# Patient Record
Sex: FEMALE | Race: WHITE | URBAN - METROPOLITAN AREA
[De-identification: names, ages, dates, MRNs, and addresses within clinical notes are randomized per-mention and may not be internally consistent; named-entity substitution may affect disease eponyms.]

---

## 2017-02-15 ENCOUNTER — OFFICE VISIT (OUTPATIENT)
Dept: FAMILY MEDICINE | Facility: CLINIC | Age: 32
End: 2017-02-15
Payer: COMMERCIAL

## 2017-02-15 VITALS
HEIGHT: 64 IN | SYSTOLIC BLOOD PRESSURE: 102 MMHG | DIASTOLIC BLOOD PRESSURE: 65 MMHG | OXYGEN SATURATION: 97 % | HEART RATE: 70 BPM | BODY MASS INDEX: 34.49 KG/M2 | TEMPERATURE: 97.7 F | WEIGHT: 202 LBS

## 2017-02-15 DIAGNOSIS — J00 ACUTE NASOPHARYNGITIS: Primary | ICD-10-CM

## 2017-02-15 PROCEDURE — 99203 OFFICE O/P NEW LOW 30 MIN: CPT | Performed by: PHYSICIAN ASSISTANT

## 2017-02-15 RX ORDER — FLUTICASONE PROPIONATE 50 MCG
1-2 SPRAY, SUSPENSION (ML) NASAL DAILY
Qty: 1 BOTTLE | Refills: 1 | Status: SHIPPED | OUTPATIENT
Start: 2017-02-15 | End: 2017-11-09

## 2017-02-15 RX ORDER — GUAIFENESIN 600 MG/1
600 TABLET, EXTENDED RELEASE ORAL 2 TIMES DAILY PRN
Qty: 20 TABLET | Refills: 0 | Status: SHIPPED | OUTPATIENT
Start: 2017-02-15 | End: 2017-08-07

## 2017-02-15 ASSESSMENT — PAIN SCALES - GENERAL: PAINLEVEL: EXTREME PAIN (9)

## 2017-02-15 NOTE — PROGRESS NOTES
"  SUBJECTIVE:                                                    Lee Thakkar is a 31 year old female who presents to clinic today for the following health issues:    ENT Symptoms             Symptoms: cc Present Absent Comment   Fever/Chills  x  Not measured   Fatigue   x    Muscle Aches   x    Eye Irritation  x     Sneezing  x     Nasal Sage/Drg  x     Sinus Pressure/Pain  x     Loss of smell  x     Dental pain   x    Sore Throat  x     Swollen Glands   x    Ear Pain/Fullness  x  Popping and painful can't sleep    Cough  x     Wheeze   x    Chest Pain   x    Shortness of breath   x    Rash   x    Other         Symptom duration:  2 day   Symptom severity:  moderate    Treatments tried:  Nothing   Contacts:  NO     Patient had a similar episode 6mo ago which was diagnosed as Strep. Throat. She was treated with penicillin. Left ear pain is most bothersome symptoms. Hearing is intact. Patient is new to [facility] [employer]. She smokes 1 pack of cigarettes every two weeks. She reports seasonal allergies and denies asthma.      Problem list and histories reviewed & adjusted, as indicated.  Additional history: as documented    Problem list, Medication list, Allergies, and Medical/Social/Surgical histories reviewed in [facility] and updated as appropriate.    ROS:  Constitutional, HEENT, cardiovascular, pulmonary, gi and gu systems are negative, except as otherwise noted.    OBJECTIVE:                                                    /65 (BP Location: Right arm, Patient Position: Chair, Cuff Size: Adult Large)  Pulse 70  Temp 97.7  F (36.5  C) (Oral)  Ht 5' 4\" (1.626 m)  Wt 202 lb (91.6 kg)  LMP 02/08/2017  SpO2 97%  BMI 34.67 kg/m2  Body mass index is 34.67 kg/(m^2).  GENERAL: healthy, alert and no distress  EYES: Eyes grossly normal to inspection, PERRL and conjunctivae and sclerae normal  HENT: ear canals and TM's normal, nose and mouth without ulcers or lesions. Possible serous fluid in left ear. Hearing intact " bilaterally.   NECK: no adenopathy, no asymmetry, masses, or scars and thyroid normal to palpation  RESP: lungs clear to auscultation - no rales, rhonchi or wheezes  CV: regular rate and rhythm, normal S1 S2, no S3 or S4, no murmur, click or rub, no peripheral edema and peripheral pulses strongd    Diagnostic Test Results: None     ASSESSMENT/PLAN:                                                          ICD-10-CM    1. Acute nasopharyngitis J00 fluticasone (FLONASE) 50 MCG/ACT spray     guaiFENesin (MUCINEX) 600 MG 12 hr tablet     Plan:   Discussed viral illness and no need for antibiotics.   Use Flonase nasal spray and mucinex qday for nose/sinus/ear symptoms  Follow-up if fever develops or symptoms worsen.    RENETTA Bah PA-C  [facility]  The student Calros Malik PAS2 acted as a scribe and the encounter documented above was completely performed by myself and the documentation reflects the work I have performed today.   Linda Eliazbeth PA-C

## 2017-02-15 NOTE — MR AVS SNAPSHOT
"              After Visit Summary   2/15/2017    Lee Sheltonr    MRN: [id number]           Patient Information     Date Of Birth          1985        Visit Information        Provider Department      2/15/2017 12:40 PM Linda Elizabeth PA-C [facility]        Today's Diagnoses     Acute nasopharyngitis    -  1       Follow-ups after your visit        Who to contact     If you have questions or need follow up information about today's clinic visit or your schedule please contact [facility] directly at [phone].  Normal or non-critical lab and imaging results will be communicated to you by Booshakahart, letter or phone within 4 business days after the clinic has received the results. If you do not hear from us within 7 days, please contact the clinic through Booshakahart or phone. If you have a critical or abnormal lab result, we will notify you by phone as soon as possible.  Submit refill requests through Medminder or call your pharmacy and they will forward the refill request to us. Please allow 3 business days for your refill to be completed.          Additional Information About Your Visit        MyChart Information     Medminder lets you send messages to your doctor, view your test results, renew your prescriptions, schedule appointments and more. To sign up, go to [email or web address] . Click on \"Log in\" on the left side of the screen, which will take you to the Welcome page. Then click on \"Sign up Now\" on the right side of the page.     You will be asked to enter the access code listed below, as well as some personal information. Please follow the directions to create your username and password.     Your access code is: [email or web address]  Expires: 2017  1:16 PM     Your access code will  in 90 days. If you need help or a new code, please call your [facility] or [phone].        Care EveryWhere ID     This is your Care EveryWhere ID. This could be used by " "other organizations to access your [facility] medical records  [id number]        Your Vitals Were     Pulse Temperature Height Last Period Pulse Oximetry BMI (Body Mass Index)    70 97.7  F (36.5  C) (Oral) 5' 4\" (1.626 m) 02/08/2017 97% 34.67 kg/m2       Blood Pressure from Last 3 Encounters:   02/15/17 102/65    Weight from Last 3 Encounters:   02/15/17 202 lb (91.6 kg)              Today, you had the following     No orders found for display         Today's Medication Changes          These changes are accurate as of: 2/15/17  1:16 PM.  If you have any questions, ask your nurse or doctor.               Start taking these medicines.        Dose/Directions    fluticasone 50 MCG/ACT spray   Commonly known as:  FLONASE   Used for:  Acute nasopharyngitis   Started by:  Linda Elizabeth PA-C        Dose:  1-2 spray   Spray 1-2 sprays into both nostrils daily   Quantity:  1 Bottle   Refills:  1       guaiFENesin 600 MG 12 hr tablet   Commonly known as:  MUCINEX   Used for:  Acute nasopharyngitis   Started by:  Linda Elizabeth PA-C        Dose:  600 mg   Take 1 tablet (600 mg) by mouth 2 times daily as needed for congestion   Quantity:  20 tablet   Refills:  0            Where to get your medicines      These medications were sent to [facility] [address]  [address], [facility] [address]     Phone:  [phone]     fluticasone 50 MCG/ACT spray    guaiFENesin 600 MG 12 hr tablet                Primary Care Provider    None Specified       No primary provider on file.        Thank you!     Thank you for choosing [facility]  for your care. Our goal is always to provide you with excellent care. Hearing back from our patients is one way we can continue to improve our services. Please take a few minutes to complete the written survey that you may receive in the mail after your visit with us. Thank you!             Your Updated " Medication List - Protect others around you: Learn how to safely use, store and throw away your medicines at www.disposemymeds.org.          This list is accurate as of: 2/15/17  1:16 PM.  Always use your most recent med list.                   Brand Name Dispense Instructions for use    fluticasone 50 MCG/ACT spray    FLONASE    1 Bottle    Spray 1-2 sprays into both nostrils daily       guaiFENesin 600 MG 12 hr tablet    MUCINEX    20 tablet    Take 1 tablet (600 mg) by mouth 2 times daily as needed for congestion       VITAMIN D (CHOLECALCIFEROL) PO      Take by mouth daily       VITAMIN E NATURAL PO

## 2017-02-15 NOTE — LETTER
[address] [address]  Phone: [phone]    February 15, 2017        Lee Miami  [address] [facility] [address]          To whom it may concern:    RE: Lee Miami    Patient was seen and treated today at our clinic and missed work. Please excuse her from work due to illness from 2/15/17-2/16/17.    Please contact me for questions or concerns.      Sincerely,        Linda Elizabeth PA-C

## 2017-02-15 NOTE — NURSING NOTE
"Chief Complaint   Patient presents with     Ear Problem     possible ear infection     Establish Care       Initial /65 (BP Location: Right arm, Patient Position: Chair, Cuff Size: Adult Large)  Pulse 70  Temp 97.7  F (36.5  C) (Oral)  Ht 5' 4\" (1.626 m)  Wt 202 lb (91.6 kg)  LMP 02/08/2017  SpO2 97%  BMI 34.67 kg/m2 Estimated body mass index is 34.67 kg/(m^2) as calculated from the following:    Height as of this encounter: 5' 4\" (1.626 m).    Weight as of this encounter: 202 lb (91.6 kg).  Medication Reconciliation: complete   Jeri Radford CMA      "

## 2017-05-05 ENCOUNTER — OFFICE VISIT (OUTPATIENT)
Dept: INTERNAL MEDICINE | Facility: CLINIC | Age: 32
End: 2017-05-05
Payer: COMMERCIAL

## 2017-05-05 VITALS
WEIGHT: 204 LBS | SYSTOLIC BLOOD PRESSURE: 111 MMHG | OXYGEN SATURATION: 98 % | TEMPERATURE: 100.4 F | BODY MASS INDEX: 35.02 KG/M2 | HEART RATE: 98 BPM | DIASTOLIC BLOOD PRESSURE: 69 MMHG

## 2017-05-05 DIAGNOSIS — J01.90 ACUTE RHINOSINUSITIS: Primary | ICD-10-CM

## 2017-05-05 PROCEDURE — 99213 OFFICE O/P EST LOW 20 MIN: CPT | Performed by: NURSE PRACTITIONER

## 2017-05-05 ASSESSMENT — PAIN SCALES - GENERAL: PAINLEVEL: EXTREME PAIN (8)

## 2017-05-05 NOTE — NURSING NOTE
"Chief Complaint   Patient presents with     Sinus Problem       Initial /69 (BP Location: Right arm, Patient Position: Chair, Cuff Size: Adult Large)  Pulse 98  Temp 100.4  F (38  C) (Oral)  Wt 204 lb (92.5 kg)  LMP 04/23/2017  SpO2 98%  Breastfeeding? No  BMI 35.02 kg/m2 Estimated body mass index is 35.02 kg/(m^2) as calculated from the following:    Height as of 2/15/17: 5' 4\" (1.626 m).    Weight as of this encounter: 204 lb (92.5 kg).  Medication Reconciliation: complete.  RHODA Armendariz MA      "

## 2017-05-05 NOTE — MR AVS SNAPSHOT
"              After Visit Summary   5/5/2017    Lee Sheltonr    MRN: [id number]           Patient Information     Date Of Birth          1985        Visit Information        Provider Department      5/5/2017 8:20 AM Ernestina Barnett APRN CNP [facility]        Today's Diagnoses     Acute rhinosinusitis    -  1      Care Instructions    Continue using the Flonase nasal spray spray, 2 sprays in each nostril once daily        Follow-ups after your visit        Your next 10 appointments already scheduled     May 08, 2017  8:00 AM CDT   PHYSICAL with TATI Rothman CNP   [facility] ([facility])    [address] [facility] [address] [phone]              Who to contact     If you have questions or need follow up information about today's clinic visit or your schedule please contact [facility] directly at [phone].  Normal or non-critical lab and imaging results will be communicated to you by MyChart, letter or phone within 4 business days after the clinic has received the results. If you do not hear from us within 7 days, please contact the clinic through Netmininghart or phone. If you have a critical or abnormal lab result, we will notify you by phone as soon as possible.  Submit refill requests through Page Mage or call your pharmacy and they will forward the refill request to us. Please allow 3 business days for your refill to be completed.          Additional Information About Your Visit        Netmininghart Information     Page Mage lets you send messages to your doctor, view your test results, renew your prescriptions, schedule appointments and more. To sign up, go to [email or web address] . Click on \"Log in\" on the left side of the screen, which will take you to the Welcome page. Then click on \"Sign up Now\" on the right side of the page.     You will be asked to enter the " access code listed below, as well as some personal information. Please follow the directions to create your username and password.     Your access code is: [email or web address]  Expires: 2017  2:16 PM     Your access code will  in 90 days. If you need help or a new code, please call your [facility] clinic or [phone].        Care EveryWhere ID     This is your Care EveryWhere ID. This could be used by other organizations to access your [facility] medical records  [id number]        Your Vitals Were     Pulse Temperature Last Period Pulse Oximetry Breastfeeding? BMI (Body Mass Index)    98 100.4  F (38  C) (Oral) 2017 98% No 35.02 kg/m2       Blood Pressure from Last 3 Encounters:   17 111/69   02/15/17 102/65    Weight from Last 3 Encounters:   17 204 lb (92.5 kg)   02/15/17 202 lb (91.6 kg)              Today, you had the following     No orders found for display         Today's Medication Changes          These changes are accurate as of: 17  8:51 AM.  If you have any questions, ask your nurse or doctor.               Start taking these medicines.        Dose/Directions    amoxicillin-clavulanate 875-125 MG per tablet   Commonly known as:  AUGMENTIN   Used for:  Acute rhinosinusitis   Started by:  Ernestina Barnett APRN CNP        Dose:  1 tablet   Take 1 tablet by mouth 2 times daily   Quantity:  20 tablet   Refills:  0            Where to get your medicines      These medications were sent to [facility] [place], [place] - [address]  [address], [facility] [address]     Phone:  [phone]     amoxicillin-clavulanate 875-125 MG per tablet                Primary Care Provider    None Specified       No primary provider on file.        Thank you!     Thank you for choosing [facility]  for your care. Our goal is always to provide you with excellent care. Hearing back from our patients is one way we can  continue to improve our services. Please take a few minutes to complete the written survey that you may receive in the mail after your visit with us. Thank you!             Your Updated Medication List - Protect others around you: Learn how to safely use, store and throw away your medicines at www.disposemymeds.org.          This list is accurate as of: 5/5/17  8:51 AM.  Always use your most recent med list.                   Brand Name Dispense Instructions for use    amoxicillin-clavulanate 875-125 MG per tablet    AUGMENTIN    20 tablet    Take 1 tablet by mouth 2 times daily       fluticasone 50 MCG/ACT spray    FLONASE    1 Bottle    Spray 1-2 sprays into both nostrils daily       guaiFENesin 600 MG 12 hr tablet    MUCINEX    20 tablet    Take 1 tablet (600 mg) by mouth 2 times daily as needed for congestion       VITAMIN D (CHOLECALCIFEROL) PO      Take by mouth daily Reported on 5/5/2017       VITAMIN E NATURAL PO

## 2017-05-05 NOTE — PROGRESS NOTES
SUBJECTIVE:                                                    Lee Thakkar is a 32 year old female who presents to clinic today for the following health issues:      Acute Illness   Acute illness concerns: sinus problem  Onset: 3 days ago    Fever: YES- 100.4    Chills/Sweats: YES    Headache (location?): YES    Sinus Pressure:YES- tender, facial pain and mucopurulent discharge    Conjunctivitis:  Watery    Ear Pain: YES: right    Rhinorrhea: YES    Congestion: YES- nasal    Sore Throat: YES- yesterday but not today     Cough: YES-non-productive    Wheeze: no    Decreased Appetite: YES    Nausea: no    Vomiting: no    Diarrhea:  no    Dysuria/Freq.: no    Fatigue/Achiness: no    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  Additional history: none    There is no problem list on file for this patient.    History reviewed. No pertinent surgical history.    Social History   Substance Use Topics     Smoking status: Current Every Day Smoker     Packs/day: 0.50     Types: Cigarettes     Smokeless tobacco: Not on file     Alcohol use Yes     History reviewed. No pertinent family history.      Current Outpatient Prescriptions   Medication Sig Dispense Refill     amoxicillin-clavulanate (AUGMENTIN) 875-125 MG per tablet Take 1 tablet by mouth 2 times daily 20 tablet 0     VITAMIN D, CHOLECALCIFEROL, PO Take by mouth daily Reported on 5/5/2017       VITAMIN E NATURAL PO        fluticasone (FLONASE) 50 MCG/ACT spray Spray 1-2 sprays into both nostrils daily (Patient not taking: Reported on 5/5/2017) 1 Bottle 1     guaiFENesin (MUCINEX) 600 MG 12 hr tablet Take 1 tablet (600 mg) by mouth 2 times  daily as needed for congestion (Patient not taking: Reported on 5/5/2017) 20 tablet 0       Reviewed and updated as needed this visit by clinical staff  Tobacco  Allergies  Meds  Med Hx  Surg Hx  Fam Hx  Soc Hx      Reviewed and updated as needed this visit by Provider         ROS:  Constitutional, HEENT, cardiovascular, pulmonary, gi and gu systems are negative, except as otherwise noted.    OBJECTIVE:                                                    /69 (BP Location: Right arm, Patient Position: Chair, Cuff Size: Adult Large)  Pulse 98  Temp 100.4  F (38  C) (Oral)  Wt 204 lb (92.5 kg)  LMP 04/23/2017  SpO2 98%  Breastfeeding? No  BMI 35.02 kg/m2  Body mass index is 35.02 kg/(m^2).  GENERAL: alert, no distress and fatigued  EYES: Eyes grossly normal to inspection, PERRL and conjunctivae and sclerae normal  HENT: normal cephalic/atraumatic, ear canals and TM's normal, nose and mouth without ulcers or lesions, nasal mucosa edematous (L>R) , rhinorrhea yellow, oropharynx clear, oral mucous membranes moist, tonsillar hypertrophy and tonsillar erythema without exudate, tenderness to bilateral frontal sinuses  NECK: no adenopathy, no asymmetry, masses, or scars and thyroid normal to palpation  RESP: lungs clear to auscultation - no rales, rhonchi or wheezes  CV: regular rate and rhythm, normal S1 S2, no S3 or S4, no murmur, click or rub, no peripheral edema and peripheral pulses strong    Diagnostic Test Results:  none      ASSESSMENT/PLAN:                                                        ICD-10-CM    1. Acute rhinosinusitis J01.90 amoxicillin-clavulanate (AUGMENTIN) 875-125 MG per tablet       Symptoms only present 3 days, but she appears acutely ill, febrile, and left nasal mucosa quite edematous. Recommend treatment with antibiotic.  Continue with Flonase nasal spray  May continue with over the counter medications as needed for symptomatic relief  Do not take more than 3,000 mg  acetaminophen in 24 hour period      TATI Matos CNP  [facility]

## 2017-05-15 ENCOUNTER — OFFICE VISIT (OUTPATIENT)
Dept: INTERNAL MEDICINE | Facility: CLINIC | Age: 32
End: 2017-05-15
Payer: COMMERCIAL

## 2017-05-15 ENCOUNTER — TELEPHONE (OUTPATIENT)
Dept: INTERNAL MEDICINE | Facility: CLINIC | Age: 32
End: 2017-05-15

## 2017-05-15 VITALS
DIASTOLIC BLOOD PRESSURE: 58 MMHG | TEMPERATURE: 98.5 F | OXYGEN SATURATION: 98 % | BODY MASS INDEX: 35.02 KG/M2 | WEIGHT: 204 LBS | HEART RATE: 84 BPM | SYSTOLIC BLOOD PRESSURE: 99 MMHG

## 2017-05-15 DIAGNOSIS — B35.1 ONYCHOMYCOSIS: ICD-10-CM

## 2017-05-15 DIAGNOSIS — F32.0 MILD SINGLE CURRENT EPISODE OF MAJOR DEPRESSIVE DISORDER (H): ICD-10-CM

## 2017-05-15 DIAGNOSIS — Z00.01 ENCOUNTER FOR PREVENTATIVE ADULT HEALTH CARE EXAM WITH ABNORMAL FINDINGS: Primary | ICD-10-CM

## 2017-05-15 DIAGNOSIS — Z72.0 TOBACCO ABUSE: ICD-10-CM

## 2017-05-15 DIAGNOSIS — Z11.3 SCREEN FOR STD (SEXUALLY TRANSMITTED DISEASE): ICD-10-CM

## 2017-05-15 DIAGNOSIS — Z12.4 CERVICAL CANCER SCREENING: ICD-10-CM

## 2017-05-15 LAB
ANION GAP SERPL CALCULATED.3IONS-SCNC: 10 MMOL/L (ref 3–14)
BUN SERPL-MCNC: 9 MG/DL (ref 7–30)
CALCIUM SERPL-MCNC: 8.6 MG/DL (ref 8.5–10.1)
CHLORIDE SERPL-SCNC: 107 MMOL/L (ref 94–109)
CO2 SERPL-SCNC: 23 MMOL/L (ref 20–32)
CREAT SERPL-MCNC: 0.76 MG/DL (ref 0.52–1.04)
ERYTHROCYTE [DISTWIDTH] IN BLOOD BY AUTOMATED COUNT: 13.2 % (ref 10–15)
GFR SERPL CREATININE-BSD FRML MDRD: 87 ML/MIN/1.7M2
GLUCOSE SERPL-MCNC: 104 MG/DL (ref 70–99)
HBA1C MFR BLD: 5.4 % (ref 4.3–6)
HCT VFR BLD AUTO: 40.7 % (ref 35–47)
HGB BLD-MCNC: 13.5 G/DL (ref 11.7–15.7)
MCH RBC QN AUTO: 31.5 PG (ref 26.5–33)
MCHC RBC AUTO-ENTMCNC: 33.2 G/DL (ref 31.5–36.5)
MCV RBC AUTO: 95 FL (ref 78–100)
PLATELET # BLD AUTO: 280 10E9/L (ref 150–450)
POTASSIUM SERPL-SCNC: 3.9 MMOL/L (ref 3.4–5.3)
RBC # BLD AUTO: 4.29 10E12/L (ref 3.8–5.2)
SODIUM SERPL-SCNC: 140 MMOL/L (ref 133–144)
TSH SERPL DL<=0.005 MIU/L-ACNC: 0.64 MU/L (ref 0.4–4)
WBC # BLD AUTO: 9.4 10E9/L (ref 4–11)

## 2017-05-15 PROCEDURE — 85027 COMPLETE CBC AUTOMATED: CPT | Performed by: NURSE PRACTITIONER

## 2017-05-15 PROCEDURE — 87591 N.GONORRHOEAE DNA AMP PROB: CPT | Performed by: NURSE PRACTITIONER

## 2017-05-15 PROCEDURE — 82306 VITAMIN D 25 HYDROXY: CPT | Performed by: NURSE PRACTITIONER

## 2017-05-15 PROCEDURE — 87491 CHLMYD TRACH DNA AMP PROBE: CPT | Performed by: NURSE PRACTITIONER

## 2017-05-15 PROCEDURE — 36415 COLL VENOUS BLD VENIPUNCTURE: CPT | Performed by: NURSE PRACTITIONER

## 2017-05-15 PROCEDURE — 86780 TREPONEMA PALLIDUM: CPT | Performed by: NURSE PRACTITIONER

## 2017-05-15 PROCEDURE — 80048 BASIC METABOLIC PNL TOTAL CA: CPT | Performed by: NURSE PRACTITIONER

## 2017-05-15 PROCEDURE — 84443 ASSAY THYROID STIM HORMONE: CPT | Performed by: NURSE PRACTITIONER

## 2017-05-15 PROCEDURE — 99213 OFFICE O/P EST LOW 20 MIN: CPT | Mod: 25 | Performed by: NURSE PRACTITIONER

## 2017-05-15 PROCEDURE — 87389 HIV-1 AG W/HIV-1&-2 AB AG IA: CPT | Performed by: NURSE PRACTITIONER

## 2017-05-15 PROCEDURE — G0476 HPV COMBO ASSAY CA SCREEN: HCPCS | Performed by: NURSE PRACTITIONER

## 2017-05-15 PROCEDURE — G0145 SCR C/V CYTO,THINLAYER,RESCR: HCPCS | Performed by: NURSE PRACTITIONER

## 2017-05-15 PROCEDURE — 87624 HPV HI-RISK TYP POOLED RSLT: CPT | Performed by: NURSE PRACTITIONER

## 2017-05-15 PROCEDURE — 99395 PREV VISIT EST AGE 18-39: CPT | Performed by: NURSE PRACTITIONER

## 2017-05-15 PROCEDURE — 83036 HEMOGLOBIN GLYCOSYLATED A1C: CPT | Performed by: NURSE PRACTITIONER

## 2017-05-15 RX ORDER — ESCITALOPRAM OXALATE 20 MG/1
TABLET ORAL
Qty: 30 TABLET | Refills: 0 | Status: SHIPPED | OUTPATIENT
Start: 2017-05-15 | End: 2017-08-07

## 2017-05-15 RX ORDER — TERBINAFINE HYDROCHLORIDE 250 MG/1
250 TABLET ORAL DAILY
Qty: 90 TABLET | Refills: 0 | Status: SHIPPED | OUTPATIENT
Start: 2017-05-15 | End: 2017-10-27

## 2017-05-15 ASSESSMENT — ANXIETY QUESTIONNAIRES
7. FEELING AFRAID AS IF SOMETHING AWFUL MIGHT HAPPEN: NEARLY EVERY DAY
IF YOU CHECKED OFF ANY PROBLEMS ON THIS QUESTIONNAIRE, HOW DIFFICULT HAVE THESE PROBLEMS MADE IT FOR YOU TO DO YOUR WORK, TAKE CARE OF THINGS AT HOME, OR GET ALONG WITH OTHER PEOPLE: VERY DIFFICULT
1. FEELING NERVOUS, ANXIOUS, OR ON EDGE: NEARLY EVERY DAY
GAD7 TOTAL SCORE: 21
5. BEING SO RESTLESS THAT IT IS HARD TO SIT STILL: NEARLY EVERY DAY
2. NOT BEING ABLE TO STOP OR CONTROL WORRYING: NEARLY EVERY DAY
3. WORRYING TOO MUCH ABOUT DIFFERENT THINGS: NEARLY EVERY DAY
6. BECOMING EASILY ANNOYED OR IRRITABLE: NEARLY EVERY DAY

## 2017-05-15 ASSESSMENT — PAIN SCALES - GENERAL: PAINLEVEL: NO PAIN (0)

## 2017-05-15 ASSESSMENT — PATIENT HEALTH QUESTIONNAIRE - PHQ9: 5. POOR APPETITE OR OVEREATING: NEARLY EVERY DAY

## 2017-05-15 NOTE — PATIENT INSTRUCTIONS
Return to clinic in 2 weeks. We will start medication for smoking cessation and follow up on depression      Preventive Health Recommendations  Female Ages 26 - 39  Yearly exam:   See your health care provider every year in order to    Review health changes.     Discuss preventive care.      Review your medicines if you your doctor has prescribed any.    Until age 30: Get a Pap test every three years (more often if you have had an abnormal result).    After age 30: Talk to your doctor about whether you should have a Pap test every 3 years or have a Pap test with HPV screening every 5 years.   You do not need a Pap test if your uterus was removed (hysterectomy) and you have not had cancer.  You should be tested each year for STDs (sexually transmitted diseases), if you're at risk.   Talk to your provider about how often to have your cholesterol checked.  If you are at risk for diabetes, you should have a diabetes test (fasting glucose).  Shots: Get a flu shot each year. Get a tetanus shot every 10 years.   Nutrition:     Eat at least 5 servings of fruits and vegetables each day.    Eat whole-grain bread, whole-wheat pasta and brown rice instead of white grains and rice.    Talk to your provider about Calcium and Vitamin D.     Lifestyle    Exercise at least 150 minutes a week (30 minutes a day, 5 days of the week). This will help you control your weight and prevent disease.    Limit alcohol to one drink per day.    No smoking.     Wear sunscreen to prevent skin cancer.    See your dentist every six months for an exam and cleaning.

## 2017-05-15 NOTE — MR AVS SNAPSHOT
After Visit Summary   5/15/2017    Lee Sheltonr    MRN: [id number]           Patient Information     Date Of Birth          1985        Visit Information        Provider Department      5/15/2017 1:20 PM Ernestina Barnett APRN [facility]        Today's Diagnoses     Routine general medical examination at a health care facility    -  1    Screen for STD (sexually transmitted disease)        Cervical cancer screening        Mild single current episode of major depressive disorder (H)        Onychomycosis        Tobacco abuse          Care Instructions    Return to clinic in 2 weeks. We will start medication for smoking cessation and follow up on depression      Preventive Health Recommendations  Female Ages 26 - 39  Yearly exam:   See your health care provider every year in order to    Review health changes.     Discuss preventive care.      Review your medicines if you your doctor has prescribed any.    Until age 30: Get a Pap test every three years (more often if you have had an abnormal result).    After age 30: Talk to your doctor about whether you should have a Pap test every 3 years or have a Pap test with HPV screening every 5 years.   You do not need a Pap test if your uterus was removed (hysterectomy) and you have not had cancer.  You should be tested each year for STDs (sexually transmitted diseases), if you're at risk.   Talk to your provider about how often to have your cholesterol checked.  If you are at risk for diabetes, you should have a diabetes test (fasting glucose).  Shots: Get a flu shot each year. Get a tetanus shot every 10 years.   Nutrition:     Eat at least 5 servings of fruits and vegetables each day.    Eat whole-grain bread, whole-wheat pasta and brown rice instead of white grains and rice.    Talk to your provider about Calcium and Vitamin D.     Lifestyle    Exercise at least 150 minutes a week (30 minutes a day, 5 days of the week).  "This will help you control your weight and prevent disease.    Limit alcohol to one drink per day.    No smoking.     Wear sunscreen to prevent skin cancer.    See your dentist every six months for an exam and cleaning.          Follow-ups after your visit        Additional Services     MENTAL HEALTH REFERRAL       Your provider has referred you to: [facility] Providers Intake Scheduling [phone]  [email or web address]    All scheduling is subject to the client's specific insurance plan & benefits, provider/location availability, and provider clinical specialities.  Please arrive 15 minutes early for your first appointment and bring your completed paperwork.    Please be aware that coverage of these services is subject to the terms and limitations of your health insurance plan.  Call member services at your health plan with any benefit or coverage questions.                  Who to contact     If you have questions or need follow up information about today's clinic visit or your schedule please contact [facility] directly at [phone].  Normal or non-critical lab and imaging results will be communicated to you by Help [employer], letter or phone within 4 business days after the clinic has received the results. If you do not hear from us within 7 days, please contact the clinic through Help [employer] or phone. If you have a critical or abnormal lab result, we will notify you by phone as soon as possible.  Submit refill requests through [employer] or call your pharmacy and they will forward the refill request to us. Please allow 3 business days for your refill to be completed.          Additional Information About Your Visit        [employer] Information     [employer] lets you send messages to your doctor, view your test results, renew your prescriptions, schedule appointments and more. To sign up, go to [email or web address] . Click on \"Log in\" on the left side of the screen, which will take " "you to the Welcome page. Then click on \"Sign up Now\" on the right side of the page.     You will be asked to enter the access code listed below, as well as some personal information. Please follow the directions to create your username and password.     Your access code is: [email or web address]  Expires: 2017  2:16 PM     Your access code will  in 90 days. If you need help or a new code, please call your [facility] clinic or [phone].        Care EveryWhere ID     This is your Care EveryWhere ID. This could be used by other organizations to access your [facility] medical records  [id number]        Your Vitals Were     Pulse Temperature Last Period Pulse Oximetry Breastfeeding? BMI (Body Mass Index)    84 98.5  F (36.9  C) (Oral) 2017 98% No 35.02 kg/m2       Blood Pressure from Last 3 Encounters:   05/15/17 99/58   17 111/69   02/15/17 102/65    Weight from Last 3 Encounters:   05/15/17 204 lb (92.5 kg)   17 204 lb (92.5 kg)   02/15/17 202 lb (91.6 kg)              We Performed the Following     Anti Treponema     Basic metabolic panel     CBC with platelets     Chlamydia trachomatis PCR     Hemoglobin A1c     HIV Antigen Antibody Combo     HPV High Risk Types DNA Cervical     MENTAL HEALTH REFERRAL     Neisseria gonorrhoeae PCR     Pap imaged thin layer screen with HPV - recommended age 30 - 65 years (select HPV order below)     TSH with free T4 reflex     Vitamin D Deficiency          Today's Medication Changes          These changes are accurate as of: 5/15/17  2:04 PM.  If you have any questions, ask your nurse or doctor.               Start taking these medicines.        Dose/Directions    escitalopram 20 MG tablet   Commonly known as:  LEXAPRO   Used for:  Mild single current episode of major depressive disorder (H)   Started by:  Ernestina Barnett APRN CNP        Take 1/2 tablet (10 mg) for 1 week, then increase to 1 tablet orally daily   Quantity:  30 tablet   Refills:  0       " terbinafine 250 MG tablet   Commonly known as:  lamISIL   Used for:  Onychomycosis   Started by:  Ernestina Barnett APRN CNP        Dose:  250 mg   Take 1 tablet (250 mg) by mouth daily   Quantity:  90 tablet   Refills:  0            Where to get your medicines      These medications were sent to [facility] - [place], [place] - [address]  [address], [facility] [address]     Phone:  [phone]     escitalopram 20 MG tablet    terbinafine 250 MG tablet                Primary Care Provider Office Phone # Fax #    TATI Rothman [phone]       [facility] [address]  [facility] [address]        Thank you!     Thank you for choosing [facility]  for your care. Our goal is always to provide you with excellent care. Hearing back from our patients is one way we can continue to improve our services. Please take a few minutes to complete the written survey that you may receive in the mail after your visit with us. Thank you!             Your Updated Medication List - Protect others around you: Learn how to safely use, store and throw away your medicines at www.disposemymeds.org.          This list is accurate as of: 5/15/17  2:04 PM.  Always use your most recent med list.                   Brand Name Dispense Instructions for use    escitalopram 20 MG tablet    LEXAPRO    30 tablet    Take 1/2 tablet (10 mg) for 1 week, then increase to 1 tablet orally daily       fluticasone 50 MCG/ACT spray    FLONASE    1 Bottle    Spray 1-2 sprays into both nostrils daily       guaiFENesin 600 MG 12 hr tablet    MUCINEX    20 tablet    Take 1 tablet (600 mg) by mouth 2 times daily as needed for congestion       terbinafine 250 MG tablet    lamISIL    90 tablet    Take 1 tablet (250 mg) by mouth daily       VITAMIN D (CHOLECALCIFEROL) PO      Take by mouth daily Reported on 5/5/2017        VITAMIN E NATURAL PO

## 2017-05-15 NOTE — TELEPHONE ENCOUNTER
Panel Management Review      Patient has the following on her problem list: None      Composite cancer screening  Chart review shows that this patient is due/due soon for the following Pap Smear  Summary:    Patient is due/failing the following:   PAP    Action needed:   Patient needs office visit for physical with pap screen.    Type of outreach:    Phone, spoke to patient.  Schedule appt. today shade Barnett CNP.    Questions for provider review:    None                                                                                                                                    RHODA Armendariz MA       Chart routed to closing chart .

## 2017-05-15 NOTE — LETTER
May 24, 2017    Lee PELAYO Phoenix  [address] [place] [id number]    Dear Lee,  We are happy to inform you that your PAP smear result from 5/15/17 is normal.  We are now able to do a follow up test on PAP smears. The DNA test is for HPV (Human Papilloma Virus). Cervical cancer is closely linked with certain types of HPV. Your result showed no evidence of high risk HPV.  Therefore we recommend you return in 3 years for your next pap smear and HPV test.  You will still need to return to the clinic every year for an annual exam and other preventive tests.  Please contact the clinic at [phone] with any questions.  Sincerely,    TATI Matos CNP/rlm

## 2017-05-15 NOTE — NURSING NOTE
"Chief Complaint   Patient presents with     Physical       Initial BP 99/58 (BP Location: Right arm, Patient Position: Chair, Cuff Size: Adult Regular)  Pulse 84  Temp 98.5  F (36.9  C) (Oral)  Wt 204 lb (92.5 kg)  LMP 04/23/2017  SpO2 98%  Breastfeeding? No  BMI 35.02 kg/m2 Estimated body mass index is 35.02 kg/(m^2) as calculated from the following:    Height as of 2/15/17: 5' 4\" (1.626 m).    Weight as of this encounter: 204 lb (92.5 kg).  Medication Reconciliation: complete.  RHODA Armendariz MA      "
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
## 2017-05-16 LAB
C TRACH DNA SPEC QL NAA+PROBE: NORMAL
DEPRECATED CALCIDIOL+CALCIFEROL SERPL-MC: 14 UG/L (ref 20–75)
HIV 1+2 AB+HIV1 P24 AG SERPL QL IA: NORMAL
N GONORRHOEA DNA SPEC QL NAA+PROBE: NORMAL
SPECIMEN SOURCE: NORMAL
SPECIMEN SOURCE: NORMAL
T PALLIDUM IGG+IGM SER QL: NEGATIVE

## 2017-05-16 ASSESSMENT — ANXIETY QUESTIONNAIRES: GAD7 TOTAL SCORE: 21

## 2017-05-16 ASSESSMENT — PATIENT HEALTH QUESTIONNAIRE - PHQ9: SUM OF ALL RESPONSES TO PHQ QUESTIONS 1-9: 18

## 2017-05-17 ENCOUNTER — TELEPHONE (OUTPATIENT)
Dept: INTERNAL MEDICINE | Facility: CLINIC | Age: 32
End: 2017-05-17

## 2017-05-17 DIAGNOSIS — E55.9 VITAMIN D DEFICIENCY: Primary | ICD-10-CM

## 2017-05-17 LAB
COPATH REPORT: NORMAL
PAP: NORMAL

## 2017-05-17 NOTE — TELEPHONE ENCOUNTER
Called and spoke with patient, advised of message as below.  She verbalized understanding.  Scheduled 2 week follow up 5/30 at 320pm.    Dolly Boyd RN  [facility]

## 2017-05-17 NOTE — TELEPHONE ENCOUNTER
TC received via result notes    Please call patient with lab results.  Vitamin D is low. This can cause fatigue and poor mood. I sent a prescription for high dose vitamin D to our clinic pharmacy. She should take 1 capsule once a week for 8 weeks. After that take a low dose vitamin D daily. This can be bought over the counter. Look for 1000 IU daily.  Other labs look good.  STD screening negative.

## 2017-05-17 NOTE — PROGRESS NOTES
Please call patient with lab results.  Vitamin D is low. This can cause fatigue and poor mood. I sent a prescription for high dose vitamin D to our clinic pharmacy. She should take 1 capsule once a week for 8 weeks. After that take a low dose vitamin D daily. This can be bought over the counter. Look for 1000 IU daily.  Other labs look good.  STD screening negative.

## 2017-05-19 LAB
FINAL DIAGNOSIS: NORMAL
HPV HR 12 DNA CVX QL NAA+PROBE: NEGATIVE
HPV16 DNA SPEC QL NAA+PROBE: NEGATIVE
HPV18 DNA SPEC QL NAA+PROBE: NEGATIVE
SPECIMEN DESCRIPTION: NORMAL

## 2017-06-26 ENCOUNTER — OFFICE VISIT (OUTPATIENT)
Dept: PSYCHOLOGY | Facility: CLINIC | Age: 32
End: 2017-06-26
Payer: COMMERCIAL

## 2017-06-26 DIAGNOSIS — F32.2 MAJOR DEPRESSIVE DISORDER, SEVERE (H): Primary | ICD-10-CM

## 2017-06-26 DIAGNOSIS — F41.1 GENERALIZED ANXIETY DISORDER: ICD-10-CM

## 2017-06-26 PROCEDURE — 90834 PSYTX W PT 45 MINUTES: CPT | Performed by: MARRIAGE & FAMILY THERAPIST

## 2017-06-26 ASSESSMENT — ANXIETY QUESTIONNAIRES
5. BEING SO RESTLESS THAT IT IS HARD TO SIT STILL: MORE THAN HALF THE DAYS
2. NOT BEING ABLE TO STOP OR CONTROL WORRYING: NEARLY EVERY DAY
3. WORRYING TOO MUCH ABOUT DIFFERENT THINGS: NEARLY EVERY DAY
6. BECOMING EASILY ANNOYED OR IRRITABLE: NEARLY EVERY DAY
IF YOU CHECKED OFF ANY PROBLEMS ON THIS QUESTIONNAIRE, HOW DIFFICULT HAVE THESE PROBLEMS MADE IT FOR YOU TO DO YOUR WORK, TAKE CARE OF THINGS AT HOME, OR GET ALONG WITH OTHER PEOPLE: EXTREMELY DIFFICULT
1. FEELING NERVOUS, ANXIOUS, OR ON EDGE: NEARLY EVERY DAY
GAD7 TOTAL SCORE: 20
7. FEELING AFRAID AS IF SOMETHING AWFUL MIGHT HAPPEN: NEARLY EVERY DAY

## 2017-06-26 ASSESSMENT — PATIENT HEALTH QUESTIONNAIRE - PHQ9: 5. POOR APPETITE OR OVEREATING: NEARLY EVERY DAY
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
## 2017-06-26 NOTE — MR AVS SNAPSHOT
"                  MRN:[id number]                      After Visit Summary   2017    Lee Thakkar    MRN: [id number]           Visit Information        Provider Department      2017 1:00 PM Willian, Evanmilka Yeboah, [facility]      Your next 10 appointments already scheduled     2017  8:00 AM CDT   Return Visit with Evan Dorsey [facility] ([address] [address]   [phone]            2017  9:00 AM CDT   Return Visit with Evan Dorsey [facility] ([address]  [place] [address]   [phone]            2017 12:00 PM CDT   Return Visit with Evan Hornent Willian [facility] ([address] [address]   [phone]              MyChart Information     [employer] lets you send messages to your doctor, view your test results, renew your prescriptions, schedule appointments and more. To sign up, go to [email or web address] . Click on \"Log in\" on the left side of the screen, which will take you to the Welcome page. Then click on \"Sign up Now\" on the right side of the page.     You will be asked to enter the access code listed below, as well as some personal information. Please follow the directions to create your username and password.     Your access code is: [email or web address]  Expires: 2017  5:57 PM     Your access code will  in 90 days. If you need help or a new code, please call your [facility] clinic or [phone].        Care EveryWhere ID     This is your Care EveryWhere ID. This could be used by other organizations to access your [facility] medical records  [id number]        Equal Access to Services     HAYLEE ANTONIO : Gian Landeros, larry keen, qaruben sterling, [facility] brendenin " rodrick diallo ah. So [facility] [phone].    ATENCIÓN: Si habla español, tiene a dubose disposición servicios gratuitos de asistencia lingüística. Llame al [phone].    We comply with applicable federal civil rights laws and [place] laws. We do not discriminate on the basis of race, color, national origin, age, disability sex, sexual orientation or gender identity.

## 2017-06-27 ASSESSMENT — ANXIETY QUESTIONNAIRES: GAD7 TOTAL SCORE: 20

## 2017-06-27 ASSESSMENT — PATIENT HEALTH QUESTIONNAIRE - PHQ9: SUM OF ALL RESPONSES TO PHQ QUESTIONS 1-9: 23

## 2017-08-07 ENCOUNTER — OFFICE VISIT (OUTPATIENT)
Dept: FAMILY MEDICINE | Facility: CLINIC | Age: 32
End: 2017-08-07
Payer: COMMERCIAL

## 2017-08-07 VITALS
BODY MASS INDEX: 34.84 KG/M2 | SYSTOLIC BLOOD PRESSURE: 115 MMHG | OXYGEN SATURATION: 96 % | DIASTOLIC BLOOD PRESSURE: 60 MMHG | TEMPERATURE: 98.4 F | WEIGHT: 203 LBS | HEART RATE: 85 BPM

## 2017-08-07 DIAGNOSIS — E55.9 VITAMIN D DEFICIENCY: ICD-10-CM

## 2017-08-07 DIAGNOSIS — N30.01 ACUTE CYSTITIS WITH HEMATURIA: ICD-10-CM

## 2017-08-07 DIAGNOSIS — F32.2 MAJOR DEPRESSIVE DISORDER, SEVERE (H): ICD-10-CM

## 2017-08-07 DIAGNOSIS — R30.0 DYSURIA: Primary | ICD-10-CM

## 2017-08-07 LAB
ALBUMIN UR-MCNC: NEGATIVE MG/DL
APPEARANCE UR: ABNORMAL
BACTERIA #/AREA URNS HPF: ABNORMAL /HPF
BILIRUB UR QL STRIP: NEGATIVE
COLOR UR AUTO: YELLOW
GLUCOSE UR STRIP-MCNC: NEGATIVE MG/DL
HGB UR QL STRIP: ABNORMAL
KETONES UR STRIP-MCNC: NEGATIVE MG/DL
LEUKOCYTE ESTERASE UR QL STRIP: ABNORMAL
MUCOUS THREADS #/AREA URNS LPF: PRESENT /LPF
NITRATE UR QL: NEGATIVE
NON-SQ EPI CELLS #/AREA URNS LPF: ABNORMAL /LPF
PH UR STRIP: 6 PH (ref 5–7)
RBC #/AREA URNS AUTO: ABNORMAL /HPF (ref 0–2)
SP GR UR STRIP: 1.02 (ref 1–1.03)
URN SPEC COLLECT METH UR: ABNORMAL
UROBILINOGEN UR STRIP-ACNC: 0.2 EU/DL (ref 0.2–1)
WBC #/AREA URNS AUTO: ABNORMAL /HPF (ref 0–2)

## 2017-08-07 PROCEDURE — 36415 COLL VENOUS BLD VENIPUNCTURE: CPT | Performed by: NURSE PRACTITIONER

## 2017-08-07 PROCEDURE — 99214 OFFICE O/P EST MOD 30 MIN: CPT | Performed by: NURSE PRACTITIONER

## 2017-08-07 PROCEDURE — 81001 URINALYSIS AUTO W/SCOPE: CPT | Performed by: NURSE PRACTITIONER

## 2017-08-07 PROCEDURE — 82306 VITAMIN D 25 HYDROXY: CPT | Performed by: NURSE PRACTITIONER

## 2017-08-07 RX ORDER — CHOLECALCIFEROL (VITAMIN D3) 50 MCG
2000 TABLET ORAL DAILY
Qty: 100 TABLET | Refills: 3 | Status: SHIPPED | OUTPATIENT
Start: 2017-08-07 | End: 2019-04-04

## 2017-08-07 RX ORDER — SULFAMETHOXAZOLE AND TRIMETHOPRIM 400; 80 MG/1; MG/1
1 TABLET ORAL 2 TIMES DAILY
Qty: 6 TABLET | Refills: 0 | Status: SHIPPED | OUTPATIENT
Start: 2017-08-07 | End: 2017-10-27

## 2017-08-07 ASSESSMENT — ANXIETY QUESTIONNAIRES
3. WORRYING TOO MUCH ABOUT DIFFERENT THINGS: MORE THAN HALF THE DAYS
5. BEING SO RESTLESS THAT IT IS HARD TO SIT STILL: MORE THAN HALF THE DAYS
7. FEELING AFRAID AS IF SOMETHING AWFUL MIGHT HAPPEN: NEARLY EVERY DAY
6. BECOMING EASILY ANNOYED OR IRRITABLE: NEARLY EVERY DAY
GAD7 TOTAL SCORE: 16
1. FEELING NERVOUS, ANXIOUS, OR ON EDGE: NEARLY EVERY DAY
2. NOT BEING ABLE TO STOP OR CONTROL WORRYING: SEVERAL DAYS
IF YOU CHECKED OFF ANY PROBLEMS ON THIS QUESTIONNAIRE, HOW DIFFICULT HAVE THESE PROBLEMS MADE IT FOR YOU TO DO YOUR WORK, TAKE CARE OF THINGS AT HOME, OR GET ALONG WITH OTHER PEOPLE: VERY DIFFICULT

## 2017-08-07 ASSESSMENT — PATIENT HEALTH QUESTIONNAIRE - PHQ9
SUM OF ALL RESPONSES TO PHQ QUESTIONS 1-9: 13
5. POOR APPETITE OR OVEREATING: MORE THAN HALF THE DAYS

## 2017-08-07 NOTE — MR AVS SNAPSHOT
"              After Visit Summary   2017    Lee Sheltonr    MRN: [id number]           Patient Information     Date Of Birth          1985        Visit Information        Provider Department      2017 2:40 PM Ernestina Barnett APRN CNP [facility]        Today's Diagnoses     Dysuria    -  1    Major depressive disorder, severe (H)        Vitamin D deficiency        Acute cystitis with hematuria           Follow-ups after your visit        Who to contact     If you have questions or need follow up information about today's clinic visit or your schedule please contact [facility] directly at [phone].  Normal or non-critical lab and imaging results will be communicated to you by [employer], letter or phone within 4 business days after the clinic has received the results. If you do not hear from us within 7 days, please contact the clinic through [employer] or phone. If you have a critical or abnormal lab result, we will notify you by phone as soon as possible.  Submit refill requests through [employer] or call your pharmacy and they will forward the refill request to us. Please allow 3 business days for your refill to be completed.          Additional Information About Your Visit        MyChart Information     [employer] lets you send messages to your doctor, view your test results, renew your prescriptions, schedule appointments and more. To sign up, go to [email or web address] . Click on \"Log in\" on the left side of the screen, which will take you to the Welcome page. Then click on \"Sign up Now\" on the right side of the page.     You will be asked to enter the access code listed below, as well as some personal information. Please follow the directions to create your username and password.     Your access code is: [email or web address]  Expires: 2017  5:57 PM     Your access code will  in 90 days. If you need help or a new code, please call your [facility] or [phone].        Care EveryWhere ID     This is your Care EveryWhere ID. This could be used by other organizations to access your [facility] medical records  [id number]        Your Vitals Were     Pulse Temperature Pulse Oximetry BMI (Body Mass Index)          85 98.4  F (36.9  C) (Oral) 96% 34.84 kg/m2         Blood Pressure from Last 3 Encounters:   08/07/17 115/60   05/15/17 99/58   05/05/17 111/69    Weight from Last 3 Encounters:   08/07/17 203 lb (92.1 kg)   05/15/17 204 lb (92.5 kg)   05/05/17 204 lb (92.5 kg)              We Performed the Following     UA reflex to Microscopic and Culture     Urine Microscopic     Vitamin D Deficiency          Today's Medication Changes          These changes are accurate as of: 8/7/17  3:46 PM.  If you have any questions, ask your nurse or doctor.               Start taking these medicines.        Dose/Directions    sulfamethoxazole-trimethoprim 400-80 MG per tablet   Commonly known as:  BACTRIM/SEPTRA   Used for:  Acute cystitis with hematuria   Started by:  Ernestina Barnett APRN CNP        Dose:  1 tablet   Take 1 tablet by mouth 2 times daily   Quantity:  6 tablet   Refills:  0         These medicines have changed or have updated prescriptions.        Dose/Directions    * VITAMIN D (CHOLECALCIFEROL) PO   This may have changed:  Another medication with the same name was added. Make sure you understand how and when to take each.   Changed by:  Linda Elizabeth PA-C        Take by mouth daily Reported on 5/5/2017   Refills:  0       * vitamin D 2000 UNITS tablet   This may have changed:  You were already taking a medication with the same name, and this prescription was added. Make sure you understand how and when to take each.   Used for:  Vitamin D deficiency   Changed by:  Ernestina Barnett APRN CNP        Dose:  2000 Units   Take 2,000 Units by mouth daily   Quantity:  100 tablet   Refills:  3       * Notice:  This list has 2 medication(s) that  are the same as other medications prescribed for you. Read the directions carefully, and ask your doctor or other care provider to review them with you.         Where to get your medicines      These medications were sent to [facility] - [place], [place] - [address]  [address], [facility] [address]     Phone:  [phone]     sulfamethoxazole-trimethoprim 400-80 MG per tablet    vitamin D 2000 UNITS tablet                Primary Care Provider Office Phone # Fax #    Ernestina Barnett, TATI [phone]       [facility] [address] [facility] [address]        Equal Access to Services     HAYLEE ANTONIO : Hadii aad ku hadasho Soomaali, waaxda luqadaha, qaybta kaalmada adeegyada, waxay brendenin haycatalina diallo . So [facility] [phone].    ATENCIÓN: Si habla español, tiene a dubose disposición servicios gratuitos de asistencia lingüística. LlSelect Medical [facility] [phone].    We comply with applicable federal civil rights laws and [place] laws. We do not discriminate on the basis of race, color, national origin, age, disability sex, sexual orientation or gender identity.            Thank you!     Thank you for choosing [facility]  for your care. Our goal is always to provide you with excellent care. Hearing back from our patients is one way we can continue to improve our services. Please take a few minutes to complete the written survey that you may receive in the mail after your visit with us. Thank you!             Your Updated Medication List - Protect others around you: Learn how to safely use, store and throw away your medicines at www.disposemymeds.org.          This list is accurate as of: 8/7/17  3:46 PM.  Always use your most recent med list.                   Brand Name Dispense Instructions for use Diagnosis    fluticasone 50 MCG/ACT spray    FLONASE    1 Bottle    Spray 1-2 sprays  into both nostrils daily    Acute nasopharyngitis       sulfamethoxazole-trimethoprim 400-80 MG per tablet    BACTRIM/SEPTRA    6 tablet    Take 1 tablet by mouth 2 times daily    Acute cystitis with hematuria       terbinafine 250 MG tablet    lamISIL    90 tablet    Take 1 tablet (250 mg) by mouth daily    Onychomycosis       * VITAMIN D (CHOLECALCIFEROL) PO      Take by mouth daily Reported on 5/5/2017        * vitamin D 2000 UNITS tablet     100 tablet    Take 2,000 Units by mouth daily    Vitamin D deficiency       VITAMIN E NATURAL PO           * Notice:  This list has 2 medication(s) that are the same as other medications prescribed for you. Read the directions carefully, and ask your doctor or other care provider to review them with you.

## 2017-08-07 NOTE — NURSING NOTE
"Chief Complaint   Patient presents with     UTI       Initial /60 (BP Location: Right arm, Patient Position: Chair, Cuff Size: Adult Regular)  Pulse 85  Temp 98.4  F (36.9  C) (Oral)  Wt 203 lb (92.1 kg)  SpO2 96%  BMI 34.84 kg/m2 Estimated body mass index is 34.84 kg/(m^2) as calculated from the following:    Height as of 2/15/17: 5' 4\" (1.626 m).    Weight as of this encounter: 203 lb (92.1 kg).  Medication Reconciliation: complete   Dede See TASNEEM Connell      "

## 2017-08-07 NOTE — PROGRESS NOTES
"  SUBJECTIVE:                                                    Lee Thakkar is a 32 year old female who presents to clinic today for the following health issues:      URINARY TRACT SYMPTOMS  Onset: 1 week ago    Description:   Painful urination (Dysuria): YES  Blood in urine (Hematuria): no   Delay in urine (Hesitency): no     Intensity: mild    Progression of Symptoms:  same    Accompanying Signs & Symptoms:  Fever/chills: no   Flank pain YES- left side 2 days ago  Nausea and vomiting: no   Any vaginal symptoms: vaginal odor  Abdominal/Pelvic Pain: YES    History:   History of frequent UTI's: no   History of kidney stones: no   Sexually Active: YES  Possibility of pregnancy: Yes    Precipitating factors:   None    Therapies Tried and outcome: Cranberry juice prn (contraindicated in Coumadin patients) but does not seem to help much    Declines STD testing    She was started on Lexapro 5/15/17 for GAD7 and MDD  Stopped after a few days. Did not want to take a medication  She reports her anxiety is better  \"I have more motivation to do things\"  She was seeing a therapist. Hasn't been for 3 weeks. Was out of town. Will make a follow up appointment  Denies suicidal ideation  Sleeping well    PHQ-9 SCORE 5/15/2017 6/26/2017 8/7/2017   Total Score 18 23 13     RICHA-7 SCORE 5/15/2017 6/26/2017 8/7/2017   Total Score 21 20 16           Problem list and histories reviewed & adjusted, as indicated.  Additional history: none    Patient Active Problem List   Diagnosis     Mild single current episode of major depressive disorder (H)     Tobacco abuse     H/O abnormal cervical Papanicolaou smear     Major depressive disorder, severe (H)     Generalized anxiety disorder     Past Surgical History:   Procedure Laterality Date     APPENDECTOMY  age 3     SALPINGO OOPHORECTOMY,R/L/KEENAN Right 2015       Social History   Substance Use Topics     Smoking status: Current Every Day Smoker     Packs/day: 0.50     Types: Cigarettes     " Smokeless tobacco: Not on file     Alcohol use Yes     Family History   Problem Relation Age of Onset     DIABETES Father              Reviewed and updated as needed this visit by clinical staffTobacco  Allergies  Meds  Med Hx  Surg Hx  Fam Hx  Soc Hx      Reviewed and updated as needed this visit by Provider         ROS:  Constitutional, HEENT, cardiovascular, pulmonary, gi and gu systems are negative, except as otherwise noted.      OBJECTIVE:   /60 (BP Location: Right arm, Patient Position: Chair, Cuff Size: Adult Regular)  Pulse 85  Temp 98.4  F (36.9  C) (Oral)  Wt 203 lb (92.1 kg)  SpO2 96%  BMI 34.84 kg/m2  Body mass index is 34.84 kg/(m^2).  GENERAL: healthy, alert and no distress  RESP: lungs clear to auscultation - no rales, rhonchi or wheezes  CV: regular rate and rhythm, normal S1 S2, no S3 or S4, no murmur, click or rub, no peripheral edema and peripheral pulses strong  BACK: no CVA tenderness  PSYCH: mentation appears normal, affect normal/bright    Diagnostic Test Results:  Results for orders placed or performed in visit on 08/07/17   UA reflex to Microscopic and Culture   Result Value Ref Range    Color Urine Yellow     Appearance Urine Slightly Cloudy     Glucose Urine Negative NEG mg/dL    Bilirubin Urine Negative NEG    Ketones Urine Negative NEG mg/dL    Specific Gravity Urine 1.020 1.003 - 1.035    Blood Urine Moderate (A) NEG    pH Urine 6.0 5.0 - 7.0 pH    Protein Albumin Urine Negative NEG mg/dL    Urobilinogen Urine 0.2 0.2 - 1.0 EU/dL    Nitrite Urine Negative NEG    Leukocyte Esterase Urine Small (A) NEG    Source Midstream Urine    Urine Microscopic   Result Value Ref Range    WBC Urine 2-5 (A) 0 - 2 /HPF    RBC Urine 5-10 (A) 0 - 2 /HPF    Squamous Epithelial /LPF Urine Moderate (A) FEW /LPF    Bacteria Urine Few (A) NEG /HPF    Mucous Urine Present (A) NEG /LPF   Vitamin D Deficiency   Result Value Ref Range    Vitamin D Deficiency screening 23 20 - 75 ug/L        ASSESSMENT/PLAN:       ICD-10-CM    1. Dysuria R30.0 UA reflex to Microscopic and Culture     Urine Microscopic   2. Major depressive disorder, severe (H) F32.2    3. Vitamin D deficiency E55.9 Cholecalciferol (VITAMIN D) 2000 UNITS tablet     Vitamin D Deficiency   4. Acute cystitis with hematuria N30.01 sulfamethoxazole-trimethoprim (BACTRIM/SEPTRA) 400-80 MG per tablet     Empiric treatment for UTI and culture  Hydration  Return to clinic if symptoms worsening or don't show improvement after 2-3 days  She declines treatment for MDD and anxiety. Reports symptoms are improving, though her PHQ9 and GAD7 are quite elevated. She wants to pursue CBT instead of medication. I think this is reasonable. She is to make follow up appointment with her therapist and return to clinic to consider medication in future if mood not showing improvement    TATI Matos [facility]

## 2017-08-08 ENCOUNTER — TELEPHONE (OUTPATIENT)
Dept: FAMILY MEDICINE | Facility: CLINIC | Age: 32
End: 2017-08-08

## 2017-08-08 DIAGNOSIS — E55.9 VITAMIN D DEFICIENCY: Primary | ICD-10-CM

## 2017-08-08 LAB — DEPRECATED CALCIDIOL+CALCIFEROL SERPL-MC: 23 UG/L (ref 20–75)

## 2017-08-08 ASSESSMENT — ANXIETY QUESTIONNAIRES: GAD7 TOTAL SCORE: 16

## 2017-08-08 NOTE — TELEPHONE ENCOUNTER
Please let patient know that her vitamin D has improved and is now within normal range, but is on the low end of normal. I would like for an additional 8 weeks of high dose vitamin D to help bring it up further. I sent a prescription to our pharmacy. Afterwards, can resume taking 1 tablet daily

## 2017-08-08 NOTE — TELEPHONE ENCOUNTER
Called and spoke with patient, advised of message as below.  She verbalized understanding.      Dolly Boyd RN  [facility]

## 2017-09-06 ENCOUNTER — OFFICE VISIT (OUTPATIENT)
Dept: PSYCHOLOGY | Facility: CLINIC | Age: 32
End: 2017-09-06
Payer: COMMERCIAL

## 2017-09-06 DIAGNOSIS — F41.1 GENERALIZED ANXIETY DISORDER: ICD-10-CM

## 2017-09-06 DIAGNOSIS — F32.2 MAJOR DEPRESSIVE DISORDER, SEVERE (H): Primary | ICD-10-CM

## 2017-09-06 PROCEDURE — 90791 PSYCH DIAGNOSTIC EVALUATION: CPT | Performed by: MARRIAGE & FAMILY THERAPIST

## 2017-09-06 ASSESSMENT — ANXIETY QUESTIONNAIRES
7. FEELING AFRAID AS IF SOMETHING AWFUL MIGHT HAPPEN: NEARLY EVERY DAY
5. BEING SO RESTLESS THAT IT IS HARD TO SIT STILL: MORE THAN HALF THE DAYS
IF YOU CHECKED OFF ANY PROBLEMS ON THIS QUESTIONNAIRE, HOW DIFFICULT HAVE THESE PROBLEMS MADE IT FOR YOU TO DO YOUR WORK, TAKE CARE OF THINGS AT HOME, OR GET ALONG WITH OTHER PEOPLE: EXTREMELY DIFFICULT
3. WORRYING TOO MUCH ABOUT DIFFERENT THINGS: NEARLY EVERY DAY
6. BECOMING EASILY ANNOYED OR IRRITABLE: NEARLY EVERY DAY
2. NOT BEING ABLE TO STOP OR CONTROL WORRYING: MORE THAN HALF THE DAYS
GAD7 TOTAL SCORE: 19
1. FEELING NERVOUS, ANXIOUS, OR ON EDGE: NEARLY EVERY DAY

## 2017-09-06 ASSESSMENT — PATIENT HEALTH QUESTIONNAIRE - PHQ9
SUM OF ALL RESPONSES TO PHQ QUESTIONS 1-9: 24
5. POOR APPETITE OR OVEREATING: NEARLY EVERY DAY

## 2017-09-06 NOTE — Clinical Note
I met with Lee for Diagnostic Assessment.  Plan is to focus on ameliorating depression and anxiety symptoms, along with likely reprocessing of past traumatic losses.  Safety plan completed as she experiences ongoing suicidal ideation.  Thank you for the referral!
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
## 2017-09-06 NOTE — PATIENT INSTRUCTIONS
"                                             [facility] GITA Butler     SAFETY PLAN:  Step 1: Warning signs / cues (Thoughts, images, mood, situation, behavior) that a crisis may be developing:    Thoughts: \"I would be better off dead.  I don't havy anyone now.\"    Images: visions of harm: \"see myself cutting my wrists and throat when it becomes overwhelming.\"    Thinking Processes: racing thoughts and \"confusion\"    Mood: worsening depression and hopelessness    Behaviors: using drugs and using alcohol    Situations: loss: uncle  a month ago and relationship problems   Step 2: Coping strategies - Things I can do to take my mind off of my problems without contacting another person (relaxation technique, physical activity):    Distress Tolerance Strategies:  listen to positive and upbeat music: by myself and read a book: \"The Empty Chair\"    Physical Activities: go for a walk    Focus on helpful thoughts:  think about happy memories: \"with my dad and the things he used to tell me\"  Step 3: People and social settings that provide distraction:   Name: Elizabeth Phone: [phone]   Name: Awais Phone: [phone]     \"sit in car\"   Step 4: Remind myself of people and things that are important to me and worth living for:  \"Awais and Elizabeth\"      Step 5: When I am in crisis, I can ask these people to help me use my safety plan:   Name: Elizabeth Phone: [phone]   Name: Awais Phone: [phone]  Step 6: Making the environment safe:     stay away from drugs and alcohol  Step 7: Professionals or agencies I can contact during a crisis:    [facility] Daytime and After Hours Crisis Number: [phone]    Suicide Prevention Lifeline: 2-896-790-TALK (0315)    Text for Life: Text the word  LIFE  to 94332.    Call 911 or go to my nearest emergency department.   I helped develop this safety plan and agree to use it when needed.  I have been given a copy of this plan.      Client signature " _________________________________________________________________  Today s date:  9/6/2017  Adapted from Safety Plan Template 2008 Marline Lehman and Pierce Gaxiola is reprinted with the express permission of the authors.  No portion of the Safety Plan Template may be reproduced without the express, written permission.  You can contact the authors at [email or web address] or [email or web address].

## 2017-09-06 NOTE — MR AVS SNAPSHOT
"                  MRN:[id number]                      After Visit Summary   2017    Lee Thakkar    MRN: [id number]           Visit Information        Provider Department      2017 12:00 PM [place] Evan [place], [facility] Generic      Your next 10 appointments already scheduled     Sep 12, 2017  1:00 PM CDT   Return Visit with Evan Bardalesriman [facility] ([facility])    [address] [place] [address]  [place] [address]   [phone]            Sep 19, 2017  2:00 PM CDT   Return Visit with Evan Bardalesriman [facility] ([facility])    [address] [place] [address]  [place] [address]   [phone]            Sep 26, 2017  2:00 PM CDT   Return Visit with Evan Hornent [place] [facility] ([facility])    [address] [place] [address]  [place] [address]   [phone]              Care Instructions                                                 Lee Thakkar     SAFETY PLAN:  Step 1: Warning signs / cues (Thoughts, images, mood, situation, behavior) that a crisis may be developing:    Thoughts: \"I would be better off dead.  I don't havy anyone now.\"    Images: visions of harm: \"see myself cutting my wrists and throat when it becomes overwhelming.\"    Thinking Processes: racing thoughts and \"confusion\"    Mood: worsening depression and hopelessness    Behaviors: using drugs and using alcohol    Situations: loss: uncle  a month ago and relationship problems   Step 2: Coping strategies - Things I can do to take my mind off of my problems without contacting another person (relaxation technique, physical activity):    Distress Tolerance Strategies:  listen to positive and upbeat music: by myself and read a book: \"The Empty Chair\"    Physical Activities: go for a walk    Focus on helpful thoughts:  think about happy memories: \"with my dad and the things he used to " "tell me\"  Step 3: People and social settings that provide distraction:   Name: Elizabeth Phone: [phone]   Name: Awais Phone: [phone]     \"sit in car\"   Step 4: Remind myself of people and things that are important to me and worth living for:  \"Awais and Elizabeth\"      Step 5: When I am in crisis, I can ask these people to help me use my safety plan:   Name: Elizabeth Phone: [phone]   Name: Awais Phone: [phone]  Step 6: Making the environment safe:     stay away from drugs and alcohol  Step 7: Professionals or agencies I can contact during a crisis:    [facility] Daytime and After Hours Crisis Number: [phone]    Suicide Prevention Lifeline: 0-765-490-TALK (6694)    Text for Life: Text the word  LIFE  to 17920.    Call 911 or go to my nearest emergency department.   I helped develop this safety plan and agree to use it when needed.  I have been given a copy of this plan.      Client signature _________________________________________________________________  Today s date:  9/6/2017  Adapted from Safety Plan Template 2008 Marline Lehman and Pierce Gaxiola is reprinted with the express permission of the authors.  No portion of the Safety Plan Template may be reproduced without the express, written permission.  You can contact the authors at [email or web address] or [email or web address] [email or web address].               MyChart Information     [employer] lets you send messages to your doctor, view your test results, renew your prescriptions, schedule appointments and more. To sign up, go to [email or web address] . Click on \"Log in\" on the left side of the screen, which will take you to the Welcome page. Then click on \"Sign up Now\" on the right side of the page.     You will be asked to enter the access code listed below, as well as some personal information. Please follow the directions to create your username and password.     Your access code is: [email or web address]  Expires: 12/5/2017  2:48 PM   "   Your access code will  in 90 days. If you need help or a new code, please call your [place] clinic or [phone].        Care EveryWhere ID     This is your Care EveryWhere ID. This could be used by other organizations to access your [place] medical records  [id number]        Equal Access to Services     HAYLEE ANTONIO : Gian Landeros, wapascual keen, bryanna kaaldominik sterling, jcarlos brasher. So [facility] [phone].    ATENCIÓN: Si habla español, tiene a dubose disposición servicios gratuitos de asistencia lingüística. Llame al [phone].    We comply with applicable federal civil rights laws and [place] laws. We do not discriminate on the basis of race, color, national origin, age, disability sex, sexual orientation or gender identity.

## 2017-09-07 ASSESSMENT — ANXIETY QUESTIONNAIRES: GAD7 TOTAL SCORE: 19

## 2017-09-12 ENCOUNTER — OFFICE VISIT (OUTPATIENT)
Dept: PSYCHOLOGY | Facility: CLINIC | Age: 32
End: 2017-09-12
Payer: COMMERCIAL

## 2017-09-12 DIAGNOSIS — F32.2 MAJOR DEPRESSIVE DISORDER, SEVERE (H): ICD-10-CM

## 2017-09-12 DIAGNOSIS — F41.1 GENERALIZED ANXIETY DISORDER: Primary | ICD-10-CM

## 2017-09-12 PROCEDURE — 90834 PSYTX W PT 45 MINUTES: CPT | Performed by: MARRIAGE & FAMILY THERAPIST

## 2017-09-12 ASSESSMENT — PATIENT HEALTH QUESTIONNAIRE - PHQ9
5. POOR APPETITE OR OVEREATING: NEARLY EVERY DAY
SUM OF ALL RESPONSES TO PHQ QUESTIONS 1-9: 23

## 2017-09-12 ASSESSMENT — ANXIETY QUESTIONNAIRES
1. FEELING NERVOUS, ANXIOUS, OR ON EDGE: NEARLY EVERY DAY
7. FEELING AFRAID AS IF SOMETHING AWFUL MIGHT HAPPEN: NEARLY EVERY DAY
2. NOT BEING ABLE TO STOP OR CONTROL WORRYING: NEARLY EVERY DAY
6. BECOMING EASILY ANNOYED OR IRRITABLE: NEARLY EVERY DAY
5. BEING SO RESTLESS THAT IT IS HARD TO SIT STILL: NEARLY EVERY DAY
GAD7 TOTAL SCORE: 21
IF YOU CHECKED OFF ANY PROBLEMS ON THIS QUESTIONNAIRE, HOW DIFFICULT HAVE THESE PROBLEMS MADE IT FOR YOU TO DO YOUR WORK, TAKE CARE OF THINGS AT HOME, OR GET ALONG WITH OTHER PEOPLE: EXTREMELY DIFFICULT
3. WORRYING TOO MUCH ABOUT DIFFERENT THINGS: NEARLY EVERY DAY

## 2017-09-12 NOTE — MR AVS SNAPSHOT
"                  MRN:[id number]                      After Visit Summary   2017    Lee Thakkar    MRN: [id number]           Visit Information        Provider Department      2017 1:00 PM [place], [facility] Generic      Your next 10 appointments already scheduled     Sep 19, 2017  2:00 PM CDT   Return Visit with Evan Dorsey [facility] ([address]  [place] [address]   [phone]            Sep 26, 2017  2:00 PM CDT   Return Visit with Evan Dorsey [facility] ([address]  [place] [address]   [phone]            Oct 03, 2017 10:00 AM CDT   Return Visit with Evan Hornent [place] [facility] ([address] [address]   [phone]              MyChart Information     [employer] lets you send messages to your doctor, view your test results, renew your prescriptions, schedule appointments and more. To sign up, go to [email or web address] . Click on \"Log in\" on the left side of the screen, which will take you to the Welcome page. Then click on \"Sign up Now\" on the right side of the page.     You will be asked to enter the access code listed below, as well as some personal information. Please follow the directions to create your username and password.     Your access code is: [email or web address]  Expires: 2017  2:48 PM     Your access code will  in 90 days. If you need help or a new code, please call your [place] clinic or [phone].        Care EveryWhere ID     This is your Care EveryWhere ID. This could be used by other organizations to access your [place] medical records  [id number]        Equal Access to Services     HAYLEE ANTONIO : Gian Landeros, larry keen, qaruben sterling, [facility] brendenin " rodrick diallo ah. So [facility] [phone].    ATENCIÓN: Si habla español, tiene a dubose disposición servicios gratuitos de asistencia lingüística. Llame al [phone].    We comply with applicable federal civil rights laws and [place] laws. We do not discriminate on the basis of race, color, national origin, age, disability sex, sexual orientation or gender identity.

## 2017-09-12 NOTE — PROGRESS NOTES
Progress Note    Client Name: Lee PELAYO [place]  Date: 9/12/17         Service Type: Individual      Session Start Time: 1:00  Session End Time: 1:52      Session Length: 38-52     Session #: 3     Attendees: Client attended alone    Treatment Plan Last Reviewed: N/A.  PHQ-9 / RICHA-7 : completed     DATA      Progress Since Last Session (Related to Symptoms / Goals / Homework):   Symptoms: Improved    Homework: Achieved / completed to satisfaction  Completed in session      Episode of Care Goals: Satisfactory progress - PREPARATION (Decided to change - considering how); Intervened by negotiating a change plan and determining options / strategies for behavior change, identifying triggers, exploring social supports, and working towards setting a date to begin behavior change     Current / Ongoing Stressors and Concerns:   Client reported that she is continuing to experience significant stress and anxiety, though she reported feeling better as a result of moving in with some supportive people.  Client reported continuing to experience a high emotional response to her past traumatic losses of her family members.     Treatment Objective(s) Addressed in This Session:   use cognitive strategies identified in therapy to challenge anxious thoughts  Client identified her desired goals for therapy.     Intervention:   CBT: taught/reviewed with client behavioral triad and REBT ABCD model for understanding and intervening on her related thoughts, feelings, and actions.  EMDR: re-administered Dissociative Experiences Scale assessment to determine client's readiness for EMDR therapy.  Motivational Interviewing: used circular/Socratic questioning to elicit client's desired goals for therapy.        ASSESSMENT: Current Emotional / Mental Status (status of significant symptoms):   Risk status (Self / Other harm or suicidal ideation)   Client denies current fears or concerns for personal  safety.   Client denies current or recent suicidal ideation or behaviors.   Client denies current or recent homicidal ideation or behaviors.   Client denies current or recent self injurious behavior or ideation.   Client denies other safety concerns.   A safety and risk management plan has been developed including: Client consented to co-developed safety plan, which includes identification of warning signs, coping strategies, people and social settings that provide distraction, things worth living for, crisis supports, and environmental safety factors.     Appearance:   Appropriate    Eye Contact:   Good    Psychomotor Behavior: Normal    Attitude:   Cooperative    Orientation:   All   Speech    Rate / Production: Normal     Volume:  Normal    Mood:    Anxious  Depressed  Normal   Affect:    Appropriate  Blunted    Thought Content:  Clear    Thought Form:  Coherent  Logical    Insight:    Good      Medication Review:   No current psychiatric medications prescribed     Medication Compliance:   NA     Changes in Health Issues:   None reported     Chemical Use Review:   Substance Use: Chemical use reviewed, no active concerns identified      Tobacco Use: No change in amount of tobacco use since last session.  Patient declined discussion at this time     Collateral Reports Completed:   Not Applicable    PLAN: (Client Tasks / Therapist Tasks / Other)  Client agreed to complete Dissociative Experiences Scale assessment to determine her readiness for EMDR therapy between now and follow-up session next week.        Evan Dorsey, NINAFT                                                         ________________________________________________________________________    Treatment Plan    Client's Name: Lee Thakkar  YOB: 1985    Date: 9/12/17    DSM-V Diagnoses: 296.23 (F32.2) Major Depressive Disorder, Single Episode, Severe _ or 300.02 (F41.1) Generalized Anxiety Disorder  Psychosocial / Contextual  Factors: history of significant traumatic losses of family members  WHODAS: 40    Referral / Collaboration:  Referral to another professional/service is not indicated at this time..    Anticipated number of session or this episode of care: 11-20      MeasurableTreatment Goal(s) related to diagnosis / functional impairment(s)  Goal 1: Client will successfully process through past trauma defined as reporting 0 Subjective Units of Distress related to trauma on 0-10 scale and 7 on Validity of (positive) Cognition scale about self on 1-7 scale   I will know I've met my goal when I no longer feel so depressed and stuck with my losses.       Objective #A (Client Action)    Status: New - Date: 9/12/17      Client will identify past traumatic events/memories which are causing current distress.     Intervention(s)  Therapist will take client's history and facilitate client's identification of targets for EMDR.     Objective #B  Client will complete needed assessment(s) and [facility] EMDR resourcing to confirm readiness for EMDR.    Status: New - Date: 9/12/17      Intervention(s)  Therapist will administer Dissociative Experiences Scale, Multidimensional Inventory of Dissociation as needed and complete [facility] EMDR resourcing with client.     Objective #C  Client will engage in installing at least 2 EMDR resources.  Status: New - Date: 9/12/17      Intervention(s)  Therapist will complete EMDR resourcing (Container, Remote Control, grounding/progressive muscle relaxation, Light Stream, Inner Advisor) with client.     Objective #D (Client Action)    Status: New - Date: 9/12/17      Client will engage in reprocessing all past traumatic event/memory targets.     Intervention(s)   Therapist will complete EMDR reprocessing with client.    Goal 2: Client will improve her baseline stable mood by 2 points on a 1-10 Likert scale per self-report (10 = optimal baseline stable mood).    I will know I've met my goal when I don't feel  so anxious and depressed.      Objective #A (Client Action)    Status: New - Date: 9/12/17     Client will use cognitive strategies identified in therapy to challenge anxious thoughts  Identify negative self-talk and behaviors: challenge core beliefs, myths, and actions.    Intervention(s)  Therapist will teach emotional regulation skills. CBT/REBT ABCD model..    Objective #B  Client will use at least 2 new coping skills for anxiety management in the next 12 weeks  Increase interest, engagement, and pleasure in doing things  Improve concentration, focus, and mindfulness in daily activities .    Status: New - Date: 9/12/17     Intervention(s)  Therapist will teach emotional regulation skills. DBT Core Mindfulness, Distress Tolerance, and Emotion Regulation skills..    Goal 3: Client will improve her feelings expression by 2 points on a 1-10 Likert scale per self-report.    I will know I've met my goal when I am able to open up more.      Objective #A (Client Action)    Status: New - Date: 9/12/17     Client will learn and utilize at least 2 new skills/strategies for identifying and expressing her emotions effectively..    Intervention(s)  Therapist will teach self-responsible communication skills, feelings identification and expression skills, DBT Interpersonal Effectiveness skills, and Nonviolent Communication..    Client has reviewed and agreed to the above plan.      Evan Dorsey, LMFT  September 12, 2017

## 2017-09-13 ASSESSMENT — ANXIETY QUESTIONNAIRES: GAD7 TOTAL SCORE: 21

## 2017-10-12 ENCOUNTER — TELEPHONE (OUTPATIENT)
Dept: FAMILY MEDICINE | Facility: CLINIC | Age: 32
End: 2017-10-12

## 2017-10-12 NOTE — TELEPHONE ENCOUNTER
Reason for Call: Request for an order or referral:    Referral being requested: Dental    Date needed: as soon as possible    Has the patient been seen by the PCP for this problem? Not Applicable    Additional comments: Patient is needing a dental referral for insurance    Phone number Patient can be reached at:  Home number on file [phone] (home)    Best Time:  anytime    Can we leave a detailed message on this number?  YES    Call taken on 10/12/2017 at 4:08 PM by Nereida Worthington

## 2017-10-12 NOTE — TELEPHONE ENCOUNTER
Patient last seen by Ernestina 8/7/17.    Is she having an acute problem or needing routine exam/cleaning?    Attempted to call patient at home number, left message on answering service requesting call back to clinic to discuss.  Brooke Pang RN  [facility]

## 2017-10-13 NOTE — TELEPHONE ENCOUNTER
Reason for Call:  Other call back    Detailed comments: patient calling back, states she has a hole in her tooth causing pain. Patient does not have a specific location in mind, looking for some where in [facility]. No need for referral. Recommended to patient to call her dental insurance to advise where to go that is covered. She will call her insurance.    Encounter closed.    Phone Number Patient can be reached at: Home number on file [phone] (home)    Best Time: any    Can we leave a detailed message on this number? YES    Call taken on 10/13/2017 at 10:06 AM by Guerline Lambert

## 2017-10-13 NOTE — TELEPHONE ENCOUNTER
Attempted to call patient at [phone] (home), no answer.  Left VM to return call to RN Triage line.    Dolly Boyd RN  [facility]

## 2017-10-19 ENCOUNTER — TELEPHONE (OUTPATIENT)
Dept: FAMILY MEDICINE | Facility: CLINIC | Age: 32
End: 2017-10-19

## 2017-10-19 NOTE — TELEPHONE ENCOUNTER
Panel Management Review      Patient has the following on her problem list:     Depression / Dysthymia review    Measure:  Needs PHQ-9 score of 4 or less during index window.  Administer PHQ-9 and if score is 5 or more, send encounter to provider for next steps.    5 - 7 month window range: 10/15/2017-12/15/2017    PHQ-9 SCORE 8/7/2017 9/6/2017 9/12/2017   Total Score 13 24 23       If PHQ-9 recheck is 5 or more, route to provider for next steps.    Patient is due for:  PHQ9 and DAP        Composite cancer screening  Chart review shows that this patient is due/due soon for the following None  Summary:    Patient is due/failing the following:   DAP and PHQ9    Action needed:   Patient needs to do PHQ9.    Type of outreach:    Sent letter. I also sent questions to be completed and returned. 10/19/2017    Questions for provider review:    None                                                                                                                                    Radha Oliva [facility]       Chart routed to Care Team .

## 2017-10-19 NOTE — LETTER
October 19, 2017    [place] PELAYO [place]  [address] [place] [address]    Dear Lee    We care about your health and have reviewed your health plan. We have reviewed your medical conditions, medication list, and lab results and are making recommendations based on this review, to better manage your health.    You are in particular need of attention regarding:  - Your Depression      Here is a list of Health Maintenance topics that are due now or due soon:  Health Maintenance Due   Topic Date Due     TETANUS IMMUNIZATION (SYSTEM ASSIGNED)  03/27/2001     DEPRESSION ACTION PLAN Q1 YR  03/27/2001     INFLUENZA VACCINE (SYSTEM ASSIGNED)  09/01/2017     We will be calling you in the next couple of weeks to help you schedule any appointments that are needed.  Please call us at [phone] (or use Glance) to address the above recommendations.     Thank you for trusting [facility] and we appreciate the opportunity to serve you.  We look forward to supporting your healthcare needs in the future.    Healthy Regards,    KAUR Hunter CMA

## 2017-10-24 NOTE — TELEPHONE ENCOUNTER
I called and left message for patient to call back regarding completing phq-9 questions  Radha Oliva CMA

## 2017-10-27 ENCOUNTER — OFFICE VISIT (OUTPATIENT)
Dept: FAMILY MEDICINE | Facility: CLINIC | Age: 32
End: 2017-10-27
Payer: COMMERCIAL

## 2017-10-27 VITALS
WEIGHT: 211 LBS | OXYGEN SATURATION: 96 % | SYSTOLIC BLOOD PRESSURE: 112 MMHG | TEMPERATURE: 98 F | BODY MASS INDEX: 36.22 KG/M2 | HEART RATE: 80 BPM | DIASTOLIC BLOOD PRESSURE: 67 MMHG

## 2017-10-27 DIAGNOSIS — Z59.00 HOMELESS: ICD-10-CM

## 2017-10-27 DIAGNOSIS — N91.2 AMENORRHEA: ICD-10-CM

## 2017-10-27 DIAGNOSIS — Z30.016 ENCOUNTER FOR INITIAL PRESCRIPTION OF TRANSDERMAL PATCH HORMONAL CONTRACEPTIVE DEVICE: ICD-10-CM

## 2017-10-27 DIAGNOSIS — Z11.3 SCREEN FOR STD (SEXUALLY TRANSMITTED DISEASE): ICD-10-CM

## 2017-10-27 DIAGNOSIS — R30.0 DYSURIA: Primary | ICD-10-CM

## 2017-10-27 DIAGNOSIS — F32.2 MAJOR DEPRESSIVE DISORDER, SEVERE (H): ICD-10-CM

## 2017-10-27 DIAGNOSIS — Z23 NEED FOR PROPHYLACTIC VACCINATION AND INOCULATION AGAINST INFLUENZA: ICD-10-CM

## 2017-10-27 LAB
ALBUMIN UR-MCNC: NEGATIVE MG/DL
APPEARANCE UR: ABNORMAL
BACTERIA #/AREA URNS HPF: ABNORMAL /HPF
BETA HCG QUAL IFA URINE: NEGATIVE
BILIRUB UR QL STRIP: NEGATIVE
COLOR UR AUTO: YELLOW
GLUCOSE UR STRIP-MCNC: NEGATIVE MG/DL
HCV AB SERPL QL IA: NONREACTIVE
HGB UR QL STRIP: ABNORMAL
HIV 1+2 AB+HIV1 P24 AG SERPL QL IA: NONREACTIVE
KETONES UR STRIP-MCNC: NEGATIVE MG/DL
LEUKOCYTE ESTERASE UR QL STRIP: ABNORMAL
MUCOUS THREADS #/AREA URNS LPF: PRESENT /LPF
NITRATE UR QL: NEGATIVE
NON-SQ EPI CELLS #/AREA URNS LPF: ABNORMAL /LPF
PH UR STRIP: 6 PH (ref 5–7)
RBC #/AREA URNS AUTO: ABNORMAL /HPF
SOURCE: ABNORMAL
SP GR UR STRIP: >1.03 (ref 1–1.03)
T PALLIDUM IGG+IGM SER QL: NEGATIVE
UROBILINOGEN UR STRIP-ACNC: 0.2 EU/DL (ref 0.2–1)
WBC #/AREA URNS AUTO: ABNORMAL /HPF

## 2017-10-27 PROCEDURE — 90686 IIV4 VACC NO PRSV 0.5 ML IM: CPT | Performed by: NURSE PRACTITIONER

## 2017-10-27 PROCEDURE — 86780 TREPONEMA PALLIDUM: CPT | Performed by: NURSE PRACTITIONER

## 2017-10-27 PROCEDURE — 87389 HIV-1 AG W/HIV-1&-2 AB AG IA: CPT | Performed by: NURSE PRACTITIONER

## 2017-10-27 PROCEDURE — 87086 URINE CULTURE/COLONY COUNT: CPT | Performed by: NURSE PRACTITIONER

## 2017-10-27 PROCEDURE — 99214 OFFICE O/P EST MOD 30 MIN: CPT | Mod: 25 | Performed by: NURSE PRACTITIONER

## 2017-10-27 PROCEDURE — 86803 HEPATITIS C AB TEST: CPT | Performed by: NURSE PRACTITIONER

## 2017-10-27 PROCEDURE — 81001 URINALYSIS AUTO W/SCOPE: CPT | Performed by: NURSE PRACTITIONER

## 2017-10-27 PROCEDURE — 87591 N.GONORRHOEAE DNA AMP PROB: CPT | Performed by: NURSE PRACTITIONER

## 2017-10-27 PROCEDURE — 90471 IMMUNIZATION ADMIN: CPT | Performed by: NURSE PRACTITIONER

## 2017-10-27 PROCEDURE — 84703 CHORIONIC GONADOTROPIN ASSAY: CPT | Performed by: NURSE PRACTITIONER

## 2017-10-27 PROCEDURE — 87491 CHLMYD TRACH DNA AMP PROBE: CPT | Performed by: NURSE PRACTITIONER

## 2017-10-27 PROCEDURE — 36415 COLL VENOUS BLD VENIPUNCTURE: CPT | Performed by: NURSE PRACTITIONER

## 2017-10-27 RX ORDER — NORELGESTROMIN AND ETHINYL ESTRADIOL 35; 150 UG/MG; UG/MG
PATCH TRANSDERMAL
Qty: 9 PATCH | Refills: 3 | Status: SHIPPED | OUTPATIENT
Start: 2017-10-27 | End: 2019-04-04

## 2017-10-27 SDOH — ECONOMIC STABILITY - HOUSING INSECURITY: HOMELESSNESS UNSPECIFIED: Z59.00

## 2017-10-27 ASSESSMENT — PAIN SCALES - GENERAL: PAINLEVEL: MILD PAIN (2)

## 2017-10-27 NOTE — PROGRESS NOTES
SUBJECTIVE:   Lee Thakkar is a 32 year old female who presents to clinic today for the following health issues:      URINARY TRACT SYMPTOMS  Onset: 1 week    Description:   Painful urination (Dysuria): YES  Blood in urine (Hematuria): no   Delay in urine (Hesitency): YES    Intensity: mild    Progression of Symptoms:  worsening    Accompanying Signs & Symptoms:  Fever/chills: no   Flank pain YES  Nausea and vomiting: no   Any vaginal symptoms: vaginal odor  Abdominal/Pelvic Pain: YES    History:   History of frequent UTI's: YES  History of kidney stones: no   Sexually Active: YES  Possibility of pregnancy: Yes    Precipitating factors:       Therapies Tried and outcome: Cranberry juice    Complains of 1 week of dysuria  Worsening  Denies N/V, flank pain    Patient seen in clinic 8/7/17 for similar symptoms  Microscopic: 2-5 WBC  Was not cultured  She reports those symptoms completely resolved after Abx    LMP 9/14  She is not on birth control  She wants to start birth control  Nonsmoker  No family history of clotting disorder or breast cancer     She has been sleeping at her aunt's but will have to leave next week  She is homeless. Has been for 2 months      She wants her thyroid checked  It was normal back in May  She quit smoking 1 month ago  Is gaining weight  She exercises daily for 20-30 minutes  Walking  No family history of thyroid disorders    She has a history of MDD  Declined medication in the past  She briefly saw Evan Dorsey for CBT but she stopped to due inconvenient location  She is now receptive to starting medication  Passive suicidal ideation  No plan      Problem list and histories reviewed & adjusted, as indicated.  Additional history: none    Patient Active Problem List   Diagnosis     Mild single current episode of major depressive disorder (H)     Tobacco abuse     H/O abnormal cervical Papanicolaou smear     Major depressive disorder, severe (H)     Generalized anxiety disorder      Past Surgical History:   Procedure Laterality Date     APPENDECTOMY  age 3     SALPINGO OOPHORECTOMY,R/L/KEENAN Right 2015       Social History   Substance Use Topics     Smoking status: Former Smoker     Packs/day: 0.50     Types: Cigarettes     Quit date: 9/1/2017     Smokeless tobacco: Never Used     Alcohol use Yes     Family History   Problem Relation Age of Onset     DIABETES Father              Reviewed and updated as needed this visit by clinical staffTobacco  Allergies  Meds  Med Hx  Surg Hx  Fam Hx  Soc Hx      Reviewed and updated as needed this visit by Provider         ROS:  Constitutional, HEENT, cardiovascular, pulmonary, gi and gu systems are negative, except as otherwise noted.      OBJECTIVE:   /67 (BP Location: Left arm, Patient Position: Chair, Cuff Size: Adult Large)  Pulse 80  Temp 98  F (36.7  C) (Oral)  Wt 211 lb (95.7 kg)  SpO2 96%  Breastfeeding? No  BMI 36.22 kg/m2  Body mass index is 36.22 kg/(m^2).  GENERAL: healthy, alert and no distress  NECK: no adenopathy, no asymmetry, masses, or scars and thyroid normal to palpation  RESP: lungs clear to auscultation - no rales, rhonchi or wheezes  CV: regular rate and rhythm, normal S1 S2, no S3 or S4, no murmur, click or rub, no peripheral edema and peripheral pulses strong  ABDOMEN: soft, nontender, no hepatosplenomegaly, no masses and bowel sounds normal  BACK: no CVA tenderness, no paralumbar tenderness  PSYCH: affect flat, judgement and insight intact and appearance well groomed, pleasant, soft voice    Diagnostic Test Results:  Results for orders placed or performed in visit on 10/27/17 (from the past 24 hour(s))   UA reflex to Microscopic and Culture   Result Value Ref Range    Color Urine Yellow     Appearance Urine Cloudy     Glucose Urine Negative NEG^Negative mg/dL    Bilirubin Urine Negative NEG^Negative    Ketones Urine Negative NEG^Negative mg/dL    Specific Gravity Urine >1.030 1.003 - 1.035    Blood Urine Large (A)  NEG^Negative    pH Urine 6.0 5.0 - 7.0 pH    Protein Albumin Urine Negative NEG^Negative mg/dL    Urobilinogen Urine 0.2 0.2 - 1.0 EU/dL    Nitrite Urine Negative NEG^Negative    Leukocyte Esterase Urine Trace (A) NEG^Negative    Source Midstream Urine    Beta [place]CG qual [place]FA [place] [facility] [place]   Result Value Ref Range    Beta HCG Qual IFA Urine Negative NEG^Negative      Urine Microscopic   Result Value Ref Range    WBC Urine 2-5 (A) OTO2^O - 2 /HPF    RBC Urine 2-5 (A) OTO2^O - 2 /HPF    Squamous Epithelial /LPF Urine Moderate (A) FEW^Few /LPF    Bacteria Urine Many (A) NEG^Negative /HPF    Mucous Urine Present (A) NEG^Negative /LPF     UPT: negative    ASSESSMENT/PLAN:   1. Dysuria  - I suspect d/t contaminant. Recommend culture prior to treatment. She declines STD testing. If culture negative, recommend G/C testing, hydration. If symptoms don't resolve, consider urology referral  - UA reflex to Microscopic and Culture  - Urine Microscopic  - Urine Culture Aerobic Bacterial    2. Amenorrhea  - Negative.   - Beta HCG qual [place]FA [place] [facility] [place]    3. Need for prophylactic vaccination and inoculation against influenza  - FLU VAC, SPLIT VIRUS IM > 3 YO (QUADRIVALENT) [88539]  - Vaccine Administration, Initial [81191]    4. Major depressive disorder, severe (H)  - Recommend starting medication and she will establish with new therapist. Discussed risks and benefits including risk of suicide. Follow up in 2 weeks  - sertraline (ZOLOFT) 50 MG tablet; Take 1/2 tablet (25 mg) for 1-2 weeks, then increase to 1 tablet orally daily  Dispense: 30 tablet; Refill: 0  - CARE COORDINATION REFERRAL  - MENTAL HEALTH REFERRAL    5. Encounter for initial prescription of transdermal patch hormonal contraceptive device  - Discussed risks and benefits of birth control options. She would like patch. Ok to start today with negative UPT  - norelgestromin-ethinyl estradiol (ORTHO EVRA) 150-35 MCG/24HR patch; Remove old patch  and apply new patch onto the skin once a week for 3 weeks (21 days). Do not wear patch week 4 (days 22-28), then repeat.  Dispense: 9 patch; Refill: 3    6. Homeless  - Our care coordinator is not in clinic today to see patient. Will ask her to reach out to patient regarding homelessness and other social welfare concerns  - CARE COORDINATION REFERRAL    7. Screen for STD (sexually transmitted disease)  - HIV Antigen Antibody Combo  - Anti Treponema  - Hepatitis C antibody  - Neisseria gonorrhoeae PCR  - Chlamydia trachomatis PCR    Patient Instructions   Follow up with me in 2 weeks  Stay hydrated      TATI Matos [facility]  Injectable Influenza Immunization Documentation    1.  Is the person to be vaccinated sick today?   No    2. Does the person to be vaccinated have an allergy to a component   of the vaccine?   No  Egg Allergy Algorithm Link    3. Has the person to be vaccinated ever had a serious reaction   to influenza vaccine in the past?   No    4. Has the person to be vaccinated ever had Guillain-Barré syndrome?   No    Form completed by RHODA Armendariz MA    Patient/or Parent of Patient instructed to wait 20 minutes after injection in the case of a allergic reaction.

## 2017-10-27 NOTE — MR AVS SNAPSHOT
After Visit Summary   10/27/2017    Lee Thakkar    MRN: [id number]           Patient Information     Date Of Birth          1985        Visit Information        Provider Department      10/27/2017 7:20 AM Ernestina Barnett APRN [facility]        Today's Diagnoses     Dysuria    -  1    Amenorrhea        Need for prophylactic vaccination and inoculation against influenza        Major depressive disorder, severe (H)        Encounter for initial prescription of transdermal patch hormonal contraceptive device        Homeless        Screen for STD (sexually transmitted disease)          Care Instructions    Follow up with me in 2 weeks  Stay hydrated          Follow-ups after your visit        Additional Services     CARE COORDINATION REFERRAL       Services are provided by a Care Coordinator for people with complex needs such as: medical, social, or financial troubles.  The Care Coordinator works with the patient and their Primary Care Provider to determine health goals, obtain resources, achieve outcomes, and develop care plans that help coordinate the patient's care.     Reason for Referral: patient is homeless for 2 months. Has severe depression    Provide additional details for Care Coordination to best meet the patient's current needs: see above    Clinical Staff have discussed the Care Coordination Referral with the patient and/or caregiver: yes            MENTAL HEALTH REFERRAL       Your provider has referred you to: Behavioral Healthcare Providers Intake Scheduling [phone]  [email or web address]    All scheduling is subject to the client's specific insurance plan & benefits, provider/location availability, and provider clinical specialities.  Please arrive 15 minutes early for your first appointment and bring your completed paperwork.    Please be aware that coverage of these services is subject to the terms and limitations of your health insurance  "plan.  Call member services at your health plan with any benefit or coverage questions.                  Who to contact     If you have questions or need follow up information about today's clinic visit or your schedule please contact [facility] directly at [phone].  Normal or non-critical lab and imaging results will be communicated to you by MyChart, letter or phone within 4 business days after the clinic has received the results. If you do not hear from us within 7 days, please contact the clinic through MyChart or phone. If you have a critical or abnormal lab result, we will notify you by phone as soon as possible.  Submit refill requests through [employer] App or call your pharmacy and they will forward the refill request to us. Please allow 3 business days for your refill to be completed.          Additional Information About Your Visit        [employer] Information     [employer] App lets you send messages to your doctor, view your test results, renew your prescriptions, schedule appointments and more. To sign up, go to [email or web address] . Click on \"Log in\" on the left side of the screen, which will take you to the Welcome page. Then click on \"Sign up Now\" on the right side of the page.     You will be asked to enter the access code listed below, as well as some personal information. Please follow the directions to create your username and password.     Your access code is: [email or web address]  Expires: 2017  2:48 PM     Your access code will  in 90 days. If you need help or a new code, please call your [facility] or [phone].        Care EveryWhere ID     This is your Care EveryWhere ID. This could be used by other organizations to access your [facility] medical records  [id number]        Your Vitals Were     Pulse Temperature Pulse Oximetry Breastfeeding? BMI (Body Mass Index)       80 98  F (36.7  C) (Oral) 96% No 36.22 kg/m2        Blood Pressure from Last 3 Encounters: "   10/27/17 112/67   08/07/17 115/60   05/15/17 99/58    Weight from Last 3 Encounters:   10/27/17 211 lb (95.7 kg)   08/07/17 203 lb (92.1 kg)   05/15/17 204 lb (92.5 kg)              We Performed the Following     Anti Treponema     Beta HCG qual IFA urine - [facility] COORDINATION REFERRAL     CHLAMYDIA TRACHOMATIS PCR     FLU VAC, SPLIT VIRUS IM > 3 YO (QUADRIVALENT) [[id number]]     Hepatitis C antibody     HIV Antigen Antibody Combo     MENTAL HEALTH REFERRAL     NEISSERIA GONORRHOEA PCR     UA reflex to Microscopic and Culture     Urine Culture Aerobic Bacterial     Urine Microscopic     Vaccine Administration, Initial [[id number]]          Today's Medication Changes          These changes are accurate as of: 10/27/17  8:17 AM.  If you have any questions, ask your nurse or doctor.               Start taking these medicines.        Dose/Directions    norelgestromin-ethinyl estradiol 150-35 MCG/24HR patch   Commonly known as:  ORTHO EVRA   Used for:  Encounter for initial prescription of transdermal patch hormonal contraceptive device   Started by:  Ernestina Barnett APRN CNP        Remove old patch and apply new patch onto the skin once a week for 3 weeks (21 days). Do not wear patch week 4 (days 22-28), then repeat.   Quantity:  9 patch   Refills:  3       sertraline 50 MG tablet   Commonly known as:  ZOLOFT   Used for:  Major depressive disorder, severe (H)   Started by:  Ernestina Barnett APRN CNP        Take 1/2 tablet (25 mg) for 1-2 weeks, then increase to 1 tablet orally daily   Quantity:  30 tablet   Refills:  0            Where to get your medicines      These medications were sent to [facility] - [place], [place] - [address]  [address], [facility] [address]     Phone:  [phone]     norelgestromin-ethinyl estradiol 150-35 MCG/24HR patch    sertraline 50 MG tablet                Primary Care Provider Office Phone # Fax #     Ernestina Venturan, APRN [phone]       [address] [facility] [address]        Equal Access to Services     HAYLEE ANTONIO : Hadii aad ku hadnicoleo Sowilmaali, waaxda luqadaha, qaybta kaalmada adeeganayelida, jcarlos brendenin hayaadorene aikengeorge jarvis imani brasher. So [facility] [phone].    ATENCIÓN: Si habla español, tiene a dubose disposición servicios gratuitos de asistencia lingüística. Llame al [phone].    We comply with applicable federal civil rights laws and [place] laws. We do not discriminate on the basis of race, color, national origin, age, disability, sex, sexual orientation, or gender identity.            Thank you!     Thank you for choosing [facility]  for your care. Our goal is always to provide you with excellent care. Hearing back from our patients is one way we can continue to improve our services. Please take a few minutes to complete the written survey that you may receive in the mail after your visit with us. Thank you!             Your Updated Medication List - Protect others around you: Learn how to safely use, store and throw away your medicines at www.disposemymeds.org.          This list is accurate as of: 10/27/17  8:17 AM.  Always use your most recent med list.                   Brand Name Dispense Instructions for use Diagnosis    fluticasone 50 MCG/ACT spray    FLONASE    1 Bottle    Spray 1-2 sprays into both nostrils daily    Acute nasopharyngitis       norelgestromin-ethinyl estradiol 150-35 MCG/24HR patch    ORTHO EVRA    9 patch    Remove old patch and apply new patch onto the skin once a week for 3 weeks (21 days). Do not wear patch week 4 (days 22-28), then repeat.    Encounter for initial prescription of transdermal patch hormonal contraceptive device       sertraline 50 MG tablet    ZOLOFT    30 tablet    Take 1/2 tablet (25 mg) for 1-2 weeks, then increase to 1 tablet orally daily    Major depressive disorder, severe (H)       * vitamin  D 2000 UNITS tablet     100 tablet    Take 2,000 Units by mouth daily    Vitamin D deficiency       * cholecalciferol 32156 UNITS capsule    VITAMIN D3    8 capsule    Take 1 capsule (50,000 Units) by mouth once a week    Vitamin D deficiency       VITAMIN E NATURAL PO           * Notice:  This list has 2 medication(s) that are the same as other medications prescribed for you. Read the directions carefully, and ask your doctor or other care provider to review them with you.

## 2017-10-27 NOTE — LETTER
[facility]  [address] [place]  [address]  [phone]        October 31, 2017    Cir[place] GITA [place]  [address] [facility] [address]        Dear Lee,    The results of your recent labs are enclosed.  Your labs look good!  Urine culture was negative.   STD testing negative. Thyroid function within normal range.      Please call the clinic if you have any concerns.    Results for orders placed or performed in visit on 10/27/17   UA reflex to Microscopic and Culture   Result Value Ref Range    Color Urine Yellow     Appearance Urine Cloudy     Glucose Urine Negative NEG^Negative mg/dL    Bilirubin Urine Negative NEG^Negative    Ketones Urine Negative NEG^Negative mg/dL    Specific Gravity Urine >1.030 1.003 - 1.035    Blood Urine Large (A) NEG^Negative    pH Urine 6.0 5.0 - 7.0 pH    Protein Albumin Urine Negative NEG^Negative mg/dL    Urobilinogen Urine 0.2 0.2 - 1.0 EU/dL    Nitrite Urine Negative NEG^Negative    Leukocyte Esterase Urine Trace (A) NEG^Negative    Source Midstream Urine    Beta HCG qual IFA urine - [facility] [place]   Result Value Ref Range    Beta HCG Qual IFA Urine Negative NEG^Negative      Urine Microscopic   Result Value Ref Range    WBC Urine 2-5 (A) OTO2^O - 2 /HPF    RBC Urine 2-5 (A) OTO2^O - 2 /HPF    Squamous Epithelial /LPF Urine Moderate (A) FEW^Few /LPF    Bacteria Urine Many (A) NEG^Negative /HPF    Mucous Urine Present (A) NEG^Negative /LPF   HIV Antigen Antibody Combo   Result Value Ref Range    HIV Antigen Antibody Combo Nonreactive NR^Nonreactive       Anti Treponema   Result Value Ref Range    Treponema pallidum Antibody Negative NEG^Negative   Hepatitis C antibody   Result Value Ref Range    Hepatitis C Antibody Nonreactive NR^Nonreactive   Urine Culture Aerobic Bacterial   Result Value Ref Range    Specimen Description Midstream Urine     Culture Micro       >100,000 colonies/mL  mixed urogenital alexandru  Susceptibility  testing not routinely done     Neisseria gonorrhoeae PCR   Result Value Ref Range    Specimen Descrip Vagina     N Gonorrhea PCR Negative NEG^Negative   Chlamydia trachomatis PCR   Result Value Ref Range    Specimen Description Vagina     Chlamydia Trachomatis PCR Negative NEG^Negative       If you have any questions please call the clinic at [phone].    Sincerely,    Ernestina DUFFY CNP  LMD

## 2017-10-27 NOTE — NURSING NOTE
"Chief Complaint   Patient presents with     Urinary Problem       Initial /67 (BP Location: Left arm, Patient Position: Chair, Cuff Size: Adult Large)  Pulse 80  Temp 98  F (36.7  C) (Oral)  Wt 211 lb (95.7 kg)  SpO2 96%  Breastfeeding? No  BMI 36.22 kg/m2 Estimated body mass index is 36.22 kg/(m^2) as calculated from the following:    Height as of 2/15/17: 5' 4\" (1.626 m).    Weight as of this encounter: 211 lb (95.7 kg).  Medication Reconciliation: complete.  RHODA Armendariz MA      "

## 2017-10-28 LAB
BACTERIA SPEC CULT: NORMAL
SPECIMEN SOURCE: NORMAL

## 2017-10-29 LAB
C TRACH DNA SPEC QL NAA+PROBE: NEGATIVE
N GONORRHOEA DNA SPEC QL NAA+PROBE: NEGATIVE
SPECIMEN SOURCE: NORMAL
SPECIMEN SOURCE: NORMAL

## 2017-10-31 NOTE — PROGRESS NOTES
[address] [address]  Phone: [phone]  Fax: [phone]      10/31/17    Lee PELAYO [place]  [address] [facility] [address]      Dear Lee,    The results of your recent labs are enclosed.  Your labs look good!  Urine culture was negative.   STD testing negative. Thyroid function within normal range.      Please call the clinic if you have any concerns.    Sincerely,    TATI Matos CNP    Your [facility] Care Team

## 2017-11-02 ENCOUNTER — CARE COORDINATION (OUTPATIENT)
Dept: CARE COORDINATION | Facility: CLINIC | Age: 32
End: 2017-11-02

## 2017-11-02 NOTE — LETTER
[place] CARE COORDINATION  [facility] [address]  Phone: [phone]      November 2, 2017      [place] PELAY[place]  [address] [facility] [address]    Dear Lee,  I am the Clinic Care Coordinator that works with your primary care provider's clinic. I recently tried to call and was unable to reach you. Below is a description of what Clinic Care Coordination is and how I can further assist you.     The Clinic Care Coordinator role is a Registered Nurse and/or  who understands the health care system. The goal of Clinic Care Coordination is to help you manage your health and improve access to the [facility] in the most efficient manner.  The Registered Nurse can assist you in meeting your health care goals by providing education, coordinating services, and strengthening the communication among your providers. The  can assist you with financial, behavioral, psychosocial, and chemical dependency and counseling/psychiatric resources.    Please feel free to keep this letter and contact information to contact me at [phone] with any further questions or concerns that may arise. We at [facility] are focused on providing you with the highest-quality healthcare experience possible and that all starts with you.       Sincerely,     SIMON Abernathy, MSW    Clinical Care Coordination  [facility]  [phone]    Enclosed: I have enclosed a copy of a 24 Hour Access Plan. This has helpful phone numbers for you to call when needed. Please keep this in an easy to access place to use as needed.

## 2017-11-02 NOTE — LETTER
Health Care Home - Access Care Plan    About Me  Patient Name:  Lee Thakkar    YOB: 1985  Age:                            32 year old   Kristin MRN:         [id number] Telephone Information:     Home Phone [phone]   Mobile [phone]       Address:    [address] [facility] [address] Email address:  No e-mail address on record      Emergency Contact(s)  Name Relationship Lgl Grd Work Phone Home Phone Mobile Phone   1. SANDY MARTINEZ Mother   [phone]    2. HOMERSYMONE Brother    [phone]   3. NO SECONDARY C* Other   [phone]              Health Maintenance: Routine Health maintenance Reviewed:   Due/Overdue for tetanus, Depression Action Plan    My Access Plan  Medical Emergency 911   Questions or concerns during clinic hours Primary Clinic Line, I will call the clinic directly: Primary Clinic: [facility]- [phone]   24 Hour Appointment Line [phone] or  [phone])  (toll free)   24 Hour Nurse Line [phone] (toll free)   Questions or concerns outside clinic hours 24 Hour Appointment Line, I will call the after-hours on-call line:   [facility] [phone] or [phone]) (toll-free)   Preferred Urgent Care Preferred Urgent Care:  (unknown)   Preferred Hospital Preferred Hospital:  (unknown)   Preferred Pharmacy [facility] [place], [place] - [address]     Behavioral Health Crisis Line The National Suicide Prevention Lifeline at 1-624.797.1585 or 918     My Care Team Members  Patient Care Team       Relationship Specialty Notifications Start End    Ernestina Barnett APRN CNP PCP - General Nurse Practitioner - Adult Health  5/15/17     Phone: [phone] Fax: [phone]         [address] [facility] [address]    Megan Gomez Clinic Care Coordinator  - Clinical  11/2/17     Phone: [phone] Fax: [phone]             My Medical and Care Information  Problem List   Patient Active Problem List   Diagnosis     Mild single current episode of major depressive disorder (H)     Tobacco abuse     H/O abnormal cervical Papanicolaou smear     Major depressive disorder, severe (H)     Generalized anxiety disorder

## 2017-11-02 NOTE — PROGRESS NOTES
Clinic Care Coordination Contact--Social Work Initial Call/Assessment  [facility]/Voicemail    Referral Source: PCP  Clinical Data: Care Coordinator Outreach.  Patient has been homeless for 2 months, was temporary living with aunt but must move out. Patient has depression, RICHA, moderate major depression, mild single recurrent episode of major depressive disorder.   Per chart review, Patient has declined medications for mood in the past.  She stopped attending CBT as this was not a convenient location.  Office note indicates that Patient has passive suicidal ideation with no plan.  There are no follow up appointments scheduled.  Patient has had 11 no shows to visits within 2017, the majority of these to CBT.      Outreach attempted x 1.  Left message on voicemail with call back information and requested return call.  SW provided the contact information for the [facility] on Patient's voice mail, this is a drop in support center for those that are homeless.  Only this # was left on Patient's voice mail noting she could call for supportive resources.      Plan: Care Coordinator did not mail introduction letter as Patient is noted to be homeless.  Care Coordinator will try to reach patient again in 3-5 business days.    SIMON Abernathy, MSW   [phone]  11/2/2017 11:06 AM    Clinic Care Coordination Contact--Social Work Initial Call/Assessment  [facility]/Voicemail    Referral Source: PCP  Clinical Data: Care Coordinator Outreach.  Patient has been homeless for 2 months, was temporary living with aunt but must move out. Patient has depression, RICHA, moderate major depression, mild single recurrent episode of major depressive disorder.   Per chart review, Patient has declined medications for mood in the past.  She stopped attending CBT as this was not a convenient location.  Office note indicates that Patient has passive suicidal ideation with no plan.  There are no follow up appointments scheduled.  Patient has had  11 no shows to visits within 2017, the majority of these to CBT.      Outreach attempted x 2.  Left message on voicemail with call back information and requested return call.  SW provided the contact information for the [facility] on Patient's voice mail, this is a drop in support center for those that are homeless.  Only this # was left on Patient's voice mail noting she could call for supportive resources.      Plan: Care Coordinator did not mail introduction letter as Patient is noted to be homeless.  SW cannot reach Patient.  Will close to Care Coordination and update PCP    Megan Gomez, SIMON, MSW   [phone]  12/20/2017 5:03 PM

## 2017-11-08 ENCOUNTER — TELEPHONE (OUTPATIENT)
Dept: FAMILY MEDICINE | Facility: CLINIC | Age: 32
End: 2017-11-08

## 2017-11-08 NOTE — TELEPHONE ENCOUNTER
Reason for Call:  Other call back and FYI    Detailed comments: Patient wanted pcp to know she has still not had her Period please call    Phone Number Patient can be reached at: Home number on file [phone] (home)    Best Time: any    Can we leave a detailed message on this number? YES    Call taken on 11/8/2017 at 4:13 PM by Shelly Pimentel

## 2017-11-08 NOTE — TELEPHONE ENCOUNTER
Discomfort/pain when urinating - describes as an irritation. Denies fever.   Encouraged to call clinic if becomes severely painful or develops a fever over 101  Patient verbalizes understanding and agreement with plan - does have appointment Monday    Beena Melissa RN  [facility]

## 2017-11-09 ENCOUNTER — OFFICE VISIT (OUTPATIENT)
Dept: FAMILY MEDICINE | Facility: CLINIC | Age: 32
End: 2017-11-09
Payer: COMMERCIAL

## 2017-11-09 VITALS
SYSTOLIC BLOOD PRESSURE: 105 MMHG | OXYGEN SATURATION: 98 % | DIASTOLIC BLOOD PRESSURE: 48 MMHG | TEMPERATURE: 97.8 F | HEART RATE: 61 BPM | WEIGHT: 207.25 LBS | BODY MASS INDEX: 35.57 KG/M2

## 2017-11-09 DIAGNOSIS — N92.6 LATE PERIOD: ICD-10-CM

## 2017-11-09 DIAGNOSIS — N30.90 CYSTITIS: ICD-10-CM

## 2017-11-09 DIAGNOSIS — J01.00 ACUTE NON-RECURRENT MAXILLARY SINUSITIS: ICD-10-CM

## 2017-11-09 DIAGNOSIS — F32.2 MAJOR DEPRESSIVE DISORDER, SEVERE (H): ICD-10-CM

## 2017-11-09 DIAGNOSIS — R30.0 DYSURIA: Primary | ICD-10-CM

## 2017-11-09 LAB
ALBUMIN UR-MCNC: ABNORMAL MG/DL
APPEARANCE UR: CLEAR
BACTERIA #/AREA URNS HPF: ABNORMAL /HPF
BETA HCG QUAL IFA URINE: NEGATIVE
BILIRUB UR QL STRIP: NEGATIVE
COLOR UR AUTO: YELLOW
GLUCOSE UR STRIP-MCNC: NEGATIVE MG/DL
HGB UR QL STRIP: ABNORMAL
KETONES UR STRIP-MCNC: 40 MG/DL
LEUKOCYTE ESTERASE UR QL STRIP: NEGATIVE
MUCOUS THREADS #/AREA URNS LPF: PRESENT /LPF
NITRATE UR QL: NEGATIVE
NON-SQ EPI CELLS #/AREA URNS LPF: ABNORMAL /LPF
PH UR STRIP: 6 PH (ref 5–7)
RBC #/AREA URNS AUTO: ABNORMAL /HPF
SOURCE: ABNORMAL
SP GR UR STRIP: 1.02 (ref 1–1.03)
UROBILINOGEN UR STRIP-ACNC: 0.2 EU/DL (ref 0.2–1)
WBC #/AREA URNS AUTO: ABNORMAL /HPF

## 2017-11-09 PROCEDURE — 84703 CHORIONIC GONADOTROPIN ASSAY: CPT | Performed by: NURSE PRACTITIONER

## 2017-11-09 PROCEDURE — 81001 URINALYSIS AUTO W/SCOPE: CPT | Performed by: NURSE PRACTITIONER

## 2017-11-09 PROCEDURE — 99214 OFFICE O/P EST MOD 30 MIN: CPT | Performed by: NURSE PRACTITIONER

## 2017-11-09 RX ORDER — SULFAMETHOXAZOLE/TRIMETHOPRIM 800-160 MG
1 TABLET ORAL 2 TIMES DAILY
Qty: 6 TABLET | Refills: 0 | Status: SHIPPED | OUTPATIENT
Start: 2017-11-09 | End: 2017-11-12

## 2017-11-09 RX ORDER — FLUTICASONE PROPIONATE 50 MCG
2 SPRAY, SUSPENSION (ML) NASAL DAILY
Qty: 1 BOTTLE | Refills: 1 | Status: SHIPPED | OUTPATIENT
Start: 2017-11-09 | End: 2019-04-04

## 2017-11-09 ASSESSMENT — PAIN SCALES - GENERAL: PAINLEVEL: SEVERE PAIN (6)

## 2017-11-09 NOTE — MR AVS SNAPSHOT
After Visit Summary   11/9/2017    Lee Sheltonr    MRN: [id number]           Patient Information     Date Of Birth          1985        Visit Information        Provider Department      11/9/2017 3:40 PM Ernestina Barnett APRN CNP [facility]        Today's Diagnoses     Dysuria    -  1    Major depressive disorder, severe (H)        Acute non-recurrent maxillary sinusitis        Late period        Cystitis          Care Instructions    Use 2 sprays of Flonase in each nostril once daily until symptoms resolve  If no improvement in 1 week, call clinic  You can take tylenol or ibuprofen as needed for pain    You must schedule with a therapist    Call our , Megan Gomez, to see if she can help you identify community resources that are available for finding housing    Take antibiotic twice daily for three days. If they do not resolve completely let me know and I'll refer to urology    Pregnancy test negative. Start birth control patch          Follow-ups after your visit        Your next 10 appointments already scheduled     Nov 13, 2017  4:00 PM CST   SHORT with TATI Rothman CNP   [facility] ([facility])    [address] [place] [address] [id number] [phone]              Who to contact     If you have questions or need follow up information about today's clinic visit or your schedule please contact [facility] directly at [phone].  Normal or non-critical lab and imaging results will be communicated to you by MyChart, letter or phone within 4 business days after the clinic has received the results. If you do not hear from us within 7 days, please contact the clinic through MyChart or phone. If you have a critical or abnormal lab result, we will notify you by phone as soon as possible.  Submit refill requests through [employer] or call your pharmacy  "and they will forward the refill request to us. Please allow 3 business days for your refill to be completed.          Additional Information About Your Visit        MyChart Information     [employer] lets you send messages to your doctor, view your test results, renew your prescriptions, schedule appointments and more. To sign up, go to [email or web address] . Click on \"Log in\" on the left side of the screen, which will take you to the Welcome page. Then click on \"Sign up Now\" on the right side of the page.     You will be asked to enter the access code listed below, as well as some personal information. Please follow the directions to create your username and password.     Your access code is: [email or web address]  Expires: 2017  1:48 PM     Your access code will  in 90 days. If you need help or a new code, please call your [facility] clinic or [phone].        Care EveryWhere ID     This is your Care EveryWhere ID. This could be used by other organizations to access your [facility] medical records  [id number]        Your Vitals Were     Pulse Temperature Last Period Pulse Oximetry Breastfeeding? BMI (Body Mass Index)    61 97.8  F (36.6  C) (Oral) 2017 (Approximate) 98% No 35.57 kg/m2       Blood Pressure from Last 3 Encounters:   17 105/48   10/27/17 112/67   17 115/60    Weight from Last 3 Encounters:   17 207 lb 4 oz (94 kg)   10/27/17 211 lb (95.7 kg)   17 203 lb (92.1 kg)              We Performed the Following     *UA reflex to Microscopic and Culture ([place] and [facility] (except [place] and [place])     Beta HCG qual IFA urine - [facility] and [place]     DEPRESSION ACTION PLAN (DAP)     Urine Microscopic          Today's Medication Changes          These changes are accurate as of: 17  4:43 PM.  If you have any questions, ask your nurse or doctor.               Start taking these medicines.        Dose/Directions    fluticasone 50 MCG/ACT spray   Commonly " known as:  FLONASE   Used for:  Acute non-recurrent maxillary sinusitis   Started by:  Ernestina Barnett APRN CNP        Dose:  2 spray   Spray 2 sprays into both nostrils daily   Quantity:  1 Bottle   Refills:  1       sulfamethoxazole-trimethoprim 800-160 MG per tablet   Commonly known as:  BACTRIM DS/SEPTRA DS   Used for:  Cystitis   Started by:  Ernestina Barnett APRN CNP        Dose:  1 tablet   Take 1 tablet by mouth 2 times daily for 3 days   Quantity:  6 tablet   Refills:  0            Where to get your medicines      These medications were sent to [facility] - [place], [place] - [address]  [address], [facility] [address]     Phone:  [phone]     fluticasone 50 MCG/ACT spray    sulfamethoxazole-trimethoprim 800-160 MG per tablet                Primary Care Provider Office Phone # Fax #    TATI Rothman [phone]       [address]  [facility] [address]        Equal Access to Services     [facility] : Hadii jeff ku hadasho Soomaali, waaxda luqadaha, qaybta kaalmada adeegyada, waxay idiin haycatalina diallo . So [facility] [phone].    ATENCIÓN: Si habla español, tiene a dubose disposición servicios gratuitos de asistencia lingüística. Llame al [phone].    We comply with applicable federal civil rights laws and [place] laws. We do not discriminate on the basis of race, color, national origin, age, disability, sex, sexual orientation, or gender identity.            Thank you!     Thank you for choosing [facility]  for your care. Our goal is always to provide you with excellent care. Hearing back from our patients is one way we can continue to improve our services. Please take a few minutes to complete the written survey that you may receive in the mail after your visit with us. Thank you!             Your Updated Medication List - Protect others around you:  Learn how to safely use, store and throw away your medicines at www.disposemymeds.org.          This list is accurate as of: 11/9/17  4:43 PM.  Always use your most recent med list.                   Brand Name Dispense Instructions for use Diagnosis    fluticasone 50 MCG/ACT spray    FLONASE    1 Bottle    Spray 2 sprays into both nostrils daily    Acute non-recurrent maxillary sinusitis       norelgestromin-ethinyl estradiol 150-35 MCG/24HR patch    ORTHO EVRA    9 patch    Remove old patch and apply new patch onto the skin once a week for 3 weeks (21 days). Do not wear patch week 4 (days 22-28), then repeat.    Encounter for initial prescription of transdermal patch hormonal contraceptive device       sertraline 50 MG tablet    ZOLOFT    30 tablet    Take 1/2 tablet (25 mg) for 1-2 weeks, then increase to 1 tablet orally daily    Major depressive disorder, severe (H)       sulfamethoxazole-trimethoprim 800-160 MG per tablet    BACTRIM DS/SEPTRA DS    6 tablet    Take 1 tablet by mouth 2 times daily for 3 days    Cystitis       vitamin D 2000 UNITS tablet     100 tablet    Take 2,000 Units by mouth daily    Vitamin D deficiency       VITAMIN E NATURAL PO

## 2017-11-09 NOTE — PROGRESS NOTES
"  SUBJECTIVE:   Lee Thakkar is a 32 year old female who presents to clinic today for the following health issues:      Acute Illness   Acute illness concerns: sinus pain   Onset: 3 days    Fever: YES- felt hot to the touch    Chills/Sweats: YES- chills    Headache (location?): YES    Sinus Pressure:YES- post-nasal drainage, facial pain, teeth pain and mucopurulent discharge    Conjunctivitis:  no    Ear Pain: YES: right    Rhinorrhea: YES    Congestion: YES    Sore Throat: no      Cough: YES-non-productive    Wheeze: YES    Decreased Appetite: no     Nausea: no     Vomiting: no     Diarrhea:  no     Dysuria/Freq.: no     Fatigue/Achiness: YES    Sick/Strep Exposure: no      Therapies Tried and outcome:  IB and OTC cold medicince    Patient seen 10/27 complaining of dysuria  She had 2-5 RBCs, otherwise culture and G/C testing negative  She also reported LMP 9/14   Ortho Evra patch was ordered  She has not started  She continues to complain of pain with urination  \"just doesn't feel right\"  Will sometimes only urinate \"a few drops\"    Complains of sinus pain and pressure x3 days  No fever    She has not scheduled with a therapist            Problem list and histories reviewed & adjusted, as indicated.  Additional history: none    Patient Active Problem List   Diagnosis     Mild single current episode of major depressive disorder (H)     Tobacco abuse     H/O abnormal cervical Papanicolaou smear     Major depressive disorder, severe (H)     Generalized anxiety disorder     Past Surgical History:   Procedure Laterality Date     APPENDECTOMY  age 3     SALPINGO OOPHORECTOMY,R/L/KEENAN Right 2015       Social History   Substance Use Topics     Smoking status: Former Smoker     Packs/day: 0.50     Types: Cigarettes     Quit date: 9/1/2017     Smokeless tobacco: Never Used     Alcohol use Yes     Family History   Problem Relation Age of Onset     DIABETES Father          Current Outpatient Prescriptions   Medication Sig " Dispense Refill     fluticasone (FLONASE) 50 MCG/ACT spray Spray 2 sprays into both nostrils daily 1 Bottle 1     sertraline (ZOLOFT) 50 MG tablet Take 1/2 tablet (25 mg) for 1-2 weeks, then increase to 1 tablet orally daily 30 tablet 0     Cholecalciferol (VITAMIN D) 2000 UNITS tablet Take 2,000 Units by mouth daily 100 tablet 3     VITAMIN E NATURAL PO        norelgestromin-ethinyl estradiol (ORTHO EVRA) 150-35 MCG/24HR patch Remove old patch and apply new patch onto the skin once a week for 3 weeks (21 days). Do not wear patch week 4 (days 22-28), then repeat. (Patient not taking: Reported on 11/9/2017) 9 patch 3         Reviewed and updated as needed this visit by clinical staff     Reviewed and updated as needed this visit by Provider         ROS:  Constitutional, HEENT, cardiovascular, pulmonary, gi and gu systems are negative, except as otherwise noted.      OBJECTIVE:   /48 (BP Location: Right arm, Patient Position: Chair, Cuff Size: Adult Regular)  Pulse 61  Temp 97.8  F (36.6  C) (Oral)  Wt 207 lb 4 oz (94 kg)  LMP 09/27/2017 (Approximate)  SpO2 98%  Breastfeeding? No  BMI 35.57 kg/m2  Body mass index is 35.57 kg/(m^2).  GENERAL: healthy, alert and no distress  EYES: Eyes grossly normal to inspection, PERRL and conjunctivae and sclerae normal  HENT: normal cephalic/atraumatic, ear canals and TM's normal, nose and mouth without ulcers or lesions, nasal mucosa edematous , rhinorrhea clear, oropharynx clear, oral mucous membranes moist and sinuses: not tender  NECK: no adenopathy, no asymmetry, masses, or scars and thyroid normal to palpation  RESP: lungs clear to auscultation - no rales, rhonchi or wheezes  CV: regular rate and rhythm, normal S1 S2, no S3 or S4, no murmur, click or rub, no peripheral edema and peripheral pulses strong    Diagnostic Test Results:  Results for orders placed or performed in visit on 11/09/17   *UA reflex to Microscopic and Culture ([place] and [facility]  (except [place] and [place])   Result Value Ref Range    Color Urine Yellow     Appearance Urine Clear     Glucose Urine Negative NEG^Negative mg/dL    Bilirubin Urine Negative NEG^Negative    Ketones Urine 40 (A) NEG^Negative mg/dL    Specific Gravity Urine 1.025 1.003 - 1.035    Blood Urine Small (A) NEG^Negative    pH Urine 6.0 5.0 - 7.0 pH    Protein Albumin Urine Trace (A) NEG^Negative mg/dL    Urobilinogen Urine 0.2 0.2 - 1.0 EU/dL    Nitrite Urine Negative NEG^Negative    Leukocyte Esterase Urine Negative NEG^Negative    Source Midstream Urine    Beta HCG qual IFA urine - [facility] and [place]   Result Value Ref Range    Beta HCG Qual IFA Urine Negative NEG^Negative      Urine Microscopic   Result Value Ref Range    WBC Urine 2-5 (A) OTO2^O - 2 /HPF    RBC Urine 5-10 (A) OTO2^O - 2 /HPF    Squamous Epithelial /LPF Urine Few FEW^Few /LPF    Bacteria Urine Few (A) NEG^Negative /HPF    Mucous Urine Present (A) NEG^Negative /LPF       ASSESSMENT/PLAN:   1. Dysuria  - Will treat based on symptoms x3 days. If not resolved, refer to urology  - *UA reflex to Microscopic and Culture ([place] and [facility] (except [place] and [place])  - Urine Microscopic    2. Major depressive disorder, severe (H)  - Again advised she needs to schedule with therapist. Our SW reached out to her. Also encouraged her to call her back. She is homeless, living in her van and staying at her brothers.     3. Acute non-recurrent maxillary sinusitis  - Discussed likely viral cause of illness and that antibiotics are not warranted.  Reviewed recommendations for symptomatic care: rest, hydration, steam inhalation, over the counter medications as needed to treat symptoms.  - fluticasone (FLONASE) 50 MCG/ACT spray; Spray 2 sprays into both nostrils daily  Dispense: 1 Bottle; Refill: 1    4. Late period  - OK to start birth control patch  - Beta HCG qual IFA urine - [facility] and [place]    5. Cystitis  - See above  -  sulfamethoxazole-trimethoprim (BACTRIM DS/SEPTRA DS) 800-160 MG per tablet; Take 1 tablet by mouth 2 times daily for 3 days  Dispense: 6 tablet; Refill: 0    Patient Instructions   Use 2 sprays of Flonase in each nostril once daily until symptoms resolve  If no improvement in 1 week, call clinic  You can take tylenol or ibuprofen as needed for pain    You must schedule with a therapist    Call our , Megan Gomez, to see if she can help you identify community resources that are available for finding housing    Take antibiotic twice daily for three days. If they do not resolve completely let me know and I'll refer to urology    Pregnancy test negative. Start birth control patch      TATI Matos [facility]  Bactrim

## 2017-11-09 NOTE — PATIENT INSTRUCTIONS
Use 2 sprays of Flonase in each nostril once daily until symptoms resolve  If no improvement in 1 week, call clinic  You can take tylenol or ibuprofen as needed for pain    You must schedule with a therapist    Call our , Megan Gomez, to see if she can help you identify community resources that are available for finding housing    Take antibiotic twice daily for three days. If they do not resolve completely let me know and I'll refer to urology    Pregnancy test negative. Start birth control patch

## 2017-12-15 NOTE — TELEPHONE ENCOUNTER
I tried to contact patient but her phone states the person you are calling can not accept calls right now  Radha Oliva CMA

## 2018-01-10 ENCOUNTER — TELEPHONE (OUTPATIENT)
Dept: FAMILY MEDICINE | Facility: CLINIC | Age: 33
End: 2018-01-10

## 2018-01-10 DIAGNOSIS — F32.2 MAJOR DEPRESSIVE DISORDER, SEVERE (H): ICD-10-CM

## 2018-01-10 NOTE — TELEPHONE ENCOUNTER
"Reason for Call:  Medication or medication refill:    Do you use a [facility]?  Name of the pharmacy and phone number for the current request:  [facility] [address] in [place], [place]    Name of the medication requested: sertraline (ZOLOFT) 50 MG tablet    Other request: patient states that this is an \"emergency refill\"    Can we leave a detailed message on this number? YES    Phone number patient can be reached at: Home number on file [phone] (home)    Best Time: asap    Call taken on 1/10/2018 at 3:25 PM by Karthikeyan Santo      "

## 2018-01-10 NOTE — TELEPHONE ENCOUNTER
"Routing refill request to provider for review/approval because:  RN unable to approve as elevated PHQ-9 score > 5    Will send to closing providers as well as PCP d/t this being an \"emergency refill\"      Dolly Boyd RN  [facility]    "

## 2018-01-10 NOTE — LETTER
[address] [id number] [phone]      January 15, 2018    Lee PELAYO [place]                                                                                                                     [address] [facility] [address]            Dear Lee,    We have tried to reach you by phone, but have been unable to connect with you.    We were calling to follow up with you about a message about a refill.    Please call us at [phone] to speak with a Nurse.      Thank You      Sincerely,         Ernestina PARIKHL

## 2018-01-10 NOTE — TELEPHONE ENCOUNTER
"Requested Prescriptions   Pending Prescriptions Disp Refills     sertraline (ZOLOFT) 50 MG tablet 30 tablet 0     Sig: Take 1/2 tablet (25 mg) for 1-2 weeks, then increase to 1 tablet orally daily    SSRIs Protocol Failed    1/10/2018  3:33 PM       Failed - PHQ-9 score less than 5 in past 6 months    The PHQ-9 criteria is meant to fail. It requires a PHQ-9 score review         Passed - Patient is age 18 or older       Passed - No active pregnancy on record       Passed - No positive pregnancy test in last 12 months       Passed - Recent (6 mo) or future visit with authorizing provider's specialty    Patient had office visit in the last 6 months or has a visit in the next 30 days with authorizing provider.  See \"Patient Info\" tab in inbasket, or \"Choose Columns\" in Meds & Orders section of the refill encounter.           PHQ-9 SCORE 8/7/2017 9/6/2017 9/12/2017   Total Score 13 24 23       "

## 2018-01-10 NOTE — TELEPHONE ENCOUNTER
It looks like she no showed her appointment with her PCP last week.  I suspect her PCP will address this later today, but please call the patient's pharmacy and authorize them to give her a pill or 2 today if needed until her PCP responds to this request in a more formal way.

## 2018-01-10 NOTE — TELEPHONE ENCOUNTER
Called pharmacy, okayed 2 pills though they do not have an encounter on her.    Called patient, no answer, left message that emergency 2 day fill was approved for now and will await further instruction from Ernestina.  Left RN Triage line in case of questions.    Dolly Boyd RN  [facility]

## 2018-01-11 NOTE — TELEPHONE ENCOUNTER
I left voicemail for patient to return call to clinic. Please see if I'm available or relay the following message to her:  I see she requested an emergency refill of Zoloft and my partner approved 2 days. Please help her to schedule follow up appointment that this or next week. Once that's scheduled, I can approved another month refill. Its important that she makes it to the appointment. Her mental health is very important.

## 2018-01-11 NOTE — TELEPHONE ENCOUNTER
Attempt # 1  Called patient at home number.  Was call answered? No, left message to call nurse line.    Beena Melissa RN  [facility]

## 2018-01-12 NOTE — TELEPHONE ENCOUNTER
4th attempt.  Attempted to call patient at [phone] (home), no answer.  Left VM to return call to RN Triage line.    Dolly Boyd RN  [facility]

## 2018-01-15 NOTE — TELEPHONE ENCOUNTER
4 attempts have been made at contacting patient with no return call.  Flagging for TC to send letter.    Dolly Boyd RN  [facility]

## 2018-01-18 ENCOUNTER — FCC EXTENDED DOCUMENTATION (OUTPATIENT)
Dept: PSYCHOLOGY | Facility: CLINIC | Age: 33
End: 2018-01-18

## 2018-01-18 NOTE — PROGRESS NOTES
"                      Discharge Summary  Multiple Sessions    Client Name: Lee Sheltonr MRN#: [id number] YOB: 1985      Intake / Discharge Date: 6/26/17      DSM5 Diagnoses: (Sustained by DSM5 Criteria Listed Above)  Diagnoses: 296.23 (F32.2) Major Depressive Disorder, Single Episode, Severe _  300.02 (F41.1) Generalized Anxiety Disorder  Psychosocial & Contextual Factors: client has experienced significant interpersonal losses of family members and client has been homeless  WHODAS 2.0 (12 item) Score: 42          Presenting Concern:  Client reported the reason for seeking therapy at intake time as \"PTSD--Anxiety/Depression.\"      Reason for Discharge:  Client did not return and Noncompliance      Disposition at Time of Last Encounter:   Comments:              Client reported that she was continuing to experience significant stress and anxiety, though she reported feeling better as a result of moving in with some supportive people.  Client reported continuing to experience a high emotional response to her past traumatic losses of her family members.     Risk Management:   Client has had a history of suicidal ideation: ideation with no intent or plan to follow through.  A safety and risk management plan has been developed including: Client consented to co-developed safety plan, which includes identification of warning signs, coping strategies, people and social settings that provide distraction, things worth living for, crisis supports, and environmental safety factors..      Referred To:  N/A (client late-cancelled/no-showed last 4 appointments and did not respond to contact attempts or make contact to reschedule).        Evan Dorsey, CHANI   1/18/2018  "

## 2018-02-07 DIAGNOSIS — F32.2 MAJOR DEPRESSIVE DISORDER, SEVERE (H): ICD-10-CM

## 2018-02-07 NOTE — LETTER
Dear Lee,         Your Care Team has attempted to reach you multiple times regarding a recent refill request for your sertraline. A 2 week supply has been provided to your pharmacy at this time. No further refills will be provided until you have been seen in clinic. If you are unable to be seen in clinic, your provider would also be okay with a telephone visit. Please contact the clinic to schedule at [phone].      Sincerely,    Ernestina DUFFY CNP, LMD

## 2018-02-07 NOTE — TELEPHONE ENCOUNTER
"Requested Prescriptions   Pending Prescriptions Disp Refills     sertraline (ZOLOFT) 50 MG tablet [Pharmacy Med Name: SERTRALINE 50MG TABLETS] 30 tablet 0    Last Written Prescription Date:  1/10/18  Last Fill Quantity: 30,  # refills: 0   [place] [facility] [place]9/17   Future Office Visit:      Sig: TAKE 1/2 TABLET BY MOUTH EVERY DAY FOR 1 TO 2 WEEKS, THEN INCREASE TO 1 TABLET BY MOUTH EVERY DAY    SSRIs Protocol Failed    2/7/2018  2:33 PM       Failed - PHQ-9 score less than 5 in past 6 months    The PHQ-9 criteria is meant to fail. It requires a PHQ-9 score review         Failed - No positive pregnancy test in last 12 months       Passed - Patient is age 18 or older       Passed - No active pregnancy on record       Passed - Recent (6 mo) or future visit with authorizing provider's specialty    Patient had office visit in the last 6 months or has a visit in the next 30 days with authorizing provider.  See \"Patient Info\" tab in inbasket, or \"Choose Columns\" in Meds & Orders section of the refill encounter.              "

## 2018-02-08 NOTE — TELEPHONE ENCOUNTER
Patient was advised with last refill that no further refills would be given without appointment. Looks like we couldn't get in contact with her. Now I wrote it on prescription. If patient moved, needs to establish with new provider. Depression poorly controlled. Ok for phone visit.

## 2018-02-08 NOTE — TELEPHONE ENCOUNTER
PHQ-9 score:    PHQ-9 SCORE 9/12/2017   Total Score 23     Routing refill request to provider for review/approval because:  PHQ 9 > 5  Rebecca Coreas, RN [facility] Triage.

## 2018-02-12 NOTE — TELEPHONE ENCOUNTER
Message left for patient to return call regarding message below. Letter printed and mailed to patient.

## 2018-04-04 ENCOUNTER — TELEPHONE (OUTPATIENT)
Dept: FAMILY MEDICINE | Facility: CLINIC | Age: 33
End: 2018-04-04

## 2018-04-04 DIAGNOSIS — F32.0 MILD SINGLE CURRENT EPISODE OF MAJOR DEPRESSIVE DISORDER (H): Primary | ICD-10-CM

## 2018-04-04 NOTE — TELEPHONE ENCOUNTER
Notified patient of the message today per PCP.  Advised if we can help her and she stated that she still would like PCP to please call her tomorrow.    Rebecca Coreas RN [facility] Triage.

## 2018-04-04 NOTE — TELEPHONE ENCOUNTER
Reason for Call:  Other call back    Detailed comments: Patient requesting to speak to only pcp in regards to medication. Please advise.     Phone Number Patient can be reached at: Home number on file [phone] (home)    Best Time: Anytime    Can we leave a detailed message on this number? YES    Call taken on 4/4/2018 at 2:04 PM by Kacie Chadwick

## 2018-04-05 NOTE — TELEPHONE ENCOUNTER
I again called patient. She states she is living in [place]. She reports that she saw a doctor for depression and he would not refill her sertraline. He said she didn't need it. She is seeing a therapist. She ran out of sertraline a few weeks ago. Passive suicidal ideation. No plan.     I find it odd that a medical provider would not refill sertraline. She has had inconsistent follow up with me in the past. I question the validity of her story. Regardless, she does have severe depression that needs to be treated. I sent in 1 month refill. I stated I would not be sending in any additional refills. She needs to schedule with a new provider this week for ongoing management. I repeated several times that I will not be sending in any additional refills without an office visit and she voiced understanding.

## 2018-04-05 NOTE — TELEPHONE ENCOUNTER
I left voicemail for patient to return call to clinic. Please see if I'm available. If I'm not, ask when I can return her call

## 2019-04-04 ENCOUNTER — OFFICE VISIT (OUTPATIENT)
Dept: FAMILY MEDICINE | Facility: CLINIC | Age: 34
End: 2019-04-04

## 2019-04-04 VITALS
BODY MASS INDEX: 39.44 KG/M2 | OXYGEN SATURATION: 98 % | HEART RATE: 85 BPM | HEIGHT: 64 IN | WEIGHT: 231 LBS | SYSTOLIC BLOOD PRESSURE: 113 MMHG | DIASTOLIC BLOOD PRESSURE: 70 MMHG | TEMPERATURE: 97.9 F

## 2019-04-04 DIAGNOSIS — F32.2 SEVERE DEPRESSION (H): Primary | ICD-10-CM

## 2019-04-04 PROCEDURE — 99214 OFFICE O/P EST MOD 30 MIN: CPT | Performed by: NURSE PRACTITIONER

## 2019-04-04 RX ORDER — SERTRALINE HYDROCHLORIDE 100 MG/1
100 TABLET, FILM COATED ORAL DAILY
Qty: 35 TABLET | Refills: 0 | Status: SHIPPED | OUTPATIENT
Start: 2019-04-04 | End: 2019-04-16

## 2019-04-04 ASSESSMENT — ANXIETY QUESTIONNAIRES
1. FEELING NERVOUS, ANXIOUS, OR ON EDGE: SEVERAL DAYS
GAD7 TOTAL SCORE: 9
6. BECOMING EASILY ANNOYED OR IRRITABLE: NEARLY EVERY DAY
3. WORRYING TOO MUCH ABOUT DIFFERENT THINGS: SEVERAL DAYS
2. NOT BEING ABLE TO STOP OR CONTROL WORRYING: NOT AT ALL
5. BEING SO RESTLESS THAT IT IS HARD TO SIT STILL: NOT AT ALL
IF YOU CHECKED OFF ANY PROBLEMS ON THIS QUESTIONNAIRE, HOW DIFFICULT HAVE THESE PROBLEMS MADE IT FOR YOU TO DO YOUR WORK, TAKE CARE OF THINGS AT HOME, OR GET ALONG WITH OTHER PEOPLE: EXTREMELY DIFFICULT
7. FEELING AFRAID AS IF SOMETHING AWFUL MIGHT HAPPEN: NEARLY EVERY DAY

## 2019-04-04 ASSESSMENT — MIFFLIN-ST. JEOR: SCORE: 1722.81

## 2019-04-04 ASSESSMENT — PAIN SCALES - GENERAL: PAINLEVEL: NO PAIN (0)

## 2019-04-04 ASSESSMENT — PATIENT HEALTH QUESTIONNAIRE - PHQ9
SUM OF ALL RESPONSES TO PHQ QUESTIONS 1-9: 13
5. POOR APPETITE OR OVEREATING: SEVERAL DAYS

## 2019-04-04 NOTE — PROGRESS NOTES
SUBJECTIVE:   Lee Thakkar is a 34 year old female who presents to clinic today for the following health issues:      Depression Followup    Status since last visit: Worsened, ran out of her Sertraline 3 months ago.    See PHQ-9 for current symptoms.  Other associated symptoms: None    Complicating factors:   Significant life event:  No   Current substance abuse:  Cannabis but very seldom.  Anxiety or Panic symptoms:  Yes-  4 since her last visit    PHQ 9/6/2017 9/12/2017 4/4/2019   PHQ-9 Total Score 24 23 13   Q9: Thoughts of better off dead/self-harm past 2 weeks Nearly every day More than half the days More than half the days       Amount of exercise or physical activity: 2-3 days/week for an average of 30-45 minutes    Problems taking medications regularly: No    Medication side effects: none    Diet: low fat/cholesterol    I have not seen her since Nov 2017, she was homeless  She moved to [place] last year to visit family  Her niece had a baby and she helped with her  She found this time very rewarding and therapeutic  She was seeing an MD in [place] and on sertraline  Increased to 200 mg daily  Did not see a therapist  She ran out of sertraline 3 months ago  Initially thought she didn't need it but depression worsening  Despite thinking she didn't need medication, she always had suicidal ideation  She is staying with a friend for now. Starts working at [employer] in 2 weeks (former employer) Will look for own place once she gets paid  Suicidal thoughts. No plan  Poor motivation to get out of bed  She is applying for MA    She stopped birth control  She is not currently sexually active        Problem list and histories reviewed & adjusted, as indicated.  Additional history: as documented    Patient Active Problem List   Diagnosis     Mild single current episode of major depressive disorder (H)     Tobacco abuse     H/O abnormal cervical Papanicolaou smear     Major depressive  "disorder, severe (H)     Generalized anxiety disorder     Past Surgical History:   Procedure Laterality Date     APPENDECTOMY  age 3     SALPINGO OOPHORECTOMY,R/L/KEENAN Right        Social History     Tobacco Use     Smoking status: Former Smoker     Packs/day: 0.50     Types: Cigarettes     Last attempt to quit: 2017     Years since quittin.5     Smokeless tobacco: Never Used   Substance Use Topics     Alcohol use: Yes     Family History   Problem Relation Age of Onset     Diabetes Father          Current Outpatient Medications   Medication Sig Dispense Refill     sertraline (ZOLOFT) 100 MG tablet Take 1 tablet (100 mg) by mouth daily Take 1/2 tablet (50 mg) by mouth daily for 1 week then 1 tablet (100 mg) for 1 week then 1 1/2 tablets (150 mg) for 1 week then 2 tablets (2 mg) daily 35 tablet 0     BP Readings from Last 3 Encounters:   19 113/70   17 105/48   10/27/17 112/67    Wt Readings from Last 3 Encounters:   19 104.8 kg (231 lb)   17 94 kg (207 lb 4 oz)   10/27/17 95.7 kg (211 lb)                    Reviewed and updated as needed this visit by clinical staff  Tobacco  Allergies  Meds  Med Hx  Surg Hx  Fam Hx  Soc Hx      Reviewed and updated as needed this visit by Provider         ROS:  Constitutional, HEENT, cardiovascular, pulmonary, gi and gu systems are negative, except as otherwise noted.    OBJECTIVE:     /70 (BP Location: Right arm, Patient Position: Chair, Cuff Size: Adult Regular)   Pulse 85   Temp 97.9  F (36.6  C) (Oral)   Ht 1.626 m (5' 4\")   Wt 104.8 kg (231 lb)   SpO2 98%   BMI 39.65 kg/m    Body mass index is 39.65 kg/m .  GENERAL: healthy, alert and no distress  RESP: lungs clear to auscultation - no rales, rhonchi or wheezes  CV: regular rate and rhythm, normal S1 S2, no S3 or S4, no murmur, click or rub, no peripheral edema and peripheral pulses strong  PSYCH: mentation appears normal, flat affect    Diagnostic Test Results:  none "     ASSESSMENT/PLAN:       ICD-10-CM    1. Severe depression (H) F32.2 sertraline (ZOLOFT) 100 MG tablet     Will restart serotonin specific reuptake inhibitor  Though she tolerated in the past, reviewed risks and benefits  Currently with passive suicidal ideation. We developed emergency plan if she becomes actively suicidal, will call clinic immediately. If outside of clinic hours, or unable to reach nurse, will call 911, reach out to family or friend or I gave her card for 24 emergency suicide hot line  Will slowly titrate up to 200 mg sertraline daily allowing up 2 weeks (or more if needed) for dose change based on side effects  She will follow up with me in 2 weeks  Again, I strongly encourage seeing a therapist. Referral placed      Patient Instructions   Take 1/2 tablet (50 mg) by mouth daily for 1-2 weeks, then 1 tablet (100 mg) for 1-2 weeks, then 1 1/2 tablets (150 mg) for 1-2 weeks then 2 tablets (2 mg) daily    Follow up with me in 2 weeks. Schedule as annual physical  Come fasting for labs. Ok to take medication with water otherwise nothing to eat or drink for 8 hours      TATI Matos [facility]

## 2019-04-04 NOTE — PATIENT INSTRUCTIONS
Take 1/2 tablet (50 mg) by mouth daily for 1-2 weeks, then 1 tablet (100 mg) for 1-2 weeks, then 1 1/2 tablets (150 mg) for 1-2 weeks then 2 tablets (2 mg) daily    Follow up with me in 2 weeks. Schedule as annual physical  Come fasting for labs. Ok to take medication with water otherwise nothing to eat or drink for 8 hours

## 2019-04-04 NOTE — Clinical Note
At our visit today we talked about having her see a therapist but I forgot to put in a referral. Please let her know that I placed a referral and she should receive a call within 24 hours to schedule. It is very important that she schedule appointment with a therapist

## 2019-04-05 ASSESSMENT — ANXIETY QUESTIONNAIRES: GAD7 TOTAL SCORE: 9

## 2019-04-15 ENCOUNTER — TELEPHONE (OUTPATIENT)
Dept: LAB | Facility: CLINIC | Age: 34
End: 2019-04-15

## 2019-04-15 DIAGNOSIS — F32.2 SEVERE DEPRESSION (H): ICD-10-CM

## 2019-04-15 NOTE — TELEPHONE ENCOUNTER
Reason for Call:  Other prescription    Detailed comments: Pt would like to speak with nurse regarding Rx Sertaline    Phone Number Patient can be reached at: Home number on file [phone] (home)    Best Time: Anytime    Can we leave a detailed message on this number? NO    Call taken on 4/15/2019 at 9:43 AM by Marquita Davison

## 2019-04-15 NOTE — TELEPHONE ENCOUNTER
Called patient and she stated she has not picked up her prescription as she left town and was not notified until she left that it was sent to [facility] pharmacy. Patient requesting it be sent to [facility] in [place], [place].     Nurse called [facility] and gave them the information to contact [facility] pharmacy to transfer the prescription. Patient notified.     Kathya Rausch RN

## 2019-04-16 RX ORDER — SERTRALINE HYDROCHLORIDE 100 MG/1
100 TABLET, FILM COATED ORAL DAILY
Qty: 35 TABLET | Refills: 0 | Status: SHIPPED | OUTPATIENT
Start: 2019-04-16

## 2019-04-16 NOTE — TELEPHONE ENCOUNTER
Patient called and stated she no longer needs this prescription to be transferred because she is back in town now. She asked if this was transferred or if it is still available for her to  at [facility]. Please call to discuss at [phone].    Thank you  Page Connelly  Patient Representative

## 2019-04-16 NOTE — TELEPHONE ENCOUNTER
Cancelled the script with [facility].  New script sent to [facility].  Landy Obrien RN

## 2020-03-12 ENCOUNTER — TELEPHONE (OUTPATIENT)
Dept: PEDIATRICS | Facility: CLINIC | Age: 35
End: 2020-03-12

## 2020-03-12 NOTE — TELEPHONE ENCOUNTER
Reason for Call:  Other / Copy of diagnose    Detailed comments: Patient called and stated she moved to another [place] and has not been in [place] for a while, however, she needs a copy of her severe depression diagnosis sent to:  [email or web address] or mailed to her new address:  [facility] [address] [place] [id number]    Phone Number Patient can be reached at: Home number on file [phone] (home)    Best Time: ASAP    Can we leave a detailed message on this number? YES    Call taken on 3/12/2020 at 8:47 AM by Lola Johnson

## 2021-06-10 ENCOUNTER — TELEPHONE (OUTPATIENT)
Dept: PEDIATRICS | Facility: CLINIC | Age: 36
End: 2021-06-10

## 2021-06-10 NOTE — TELEPHONE ENCOUNTER
Please call patient. I last saw in 2019 for depression and I see she hasn't been seen since.   I recommend scheduling visit with new provider from [place] or [facility] to establish care. Can follow up on mood at that time. She is also due for her pap